# Patient Record
Sex: FEMALE | Race: OTHER | NOT HISPANIC OR LATINO | URBAN - METROPOLITAN AREA
[De-identification: names, ages, dates, MRNs, and addresses within clinical notes are randomized per-mention and may not be internally consistent; named-entity substitution may affect disease eponyms.]

---

## 2017-05-03 ENCOUNTER — OUTPATIENT (OUTPATIENT)
Dept: OUTPATIENT SERVICES | Facility: HOSPITAL | Age: 77
LOS: 1 days | End: 2017-05-03
Payer: COMMERCIAL

## 2017-05-03 VITALS
TEMPERATURE: 98 F | RESPIRATION RATE: 14 BRPM | SYSTOLIC BLOOD PRESSURE: 158 MMHG | WEIGHT: 164.02 LBS | DIASTOLIC BLOOD PRESSURE: 82 MMHG | HEART RATE: 60 BPM | OXYGEN SATURATION: 99 % | HEIGHT: 62 IN

## 2017-05-03 DIAGNOSIS — K43.0 INCISIONAL HERNIA WITH OBSTRUCTION, WITHOUT GANGRENE: Chronic | ICD-10-CM

## 2017-05-03 DIAGNOSIS — Z90.49 ACQUIRED ABSENCE OF OTHER SPECIFIED PARTS OF DIGESTIVE TRACT: Chronic | ICD-10-CM

## 2017-05-03 DIAGNOSIS — Z90.11 ACQUIRED ABSENCE OF RIGHT BREAST AND NIPPLE: Chronic | ICD-10-CM

## 2017-05-03 DIAGNOSIS — S86.002S UNSPECIFIED INJURY OF LEFT ACHILLES TENDON, SEQUELA: Chronic | ICD-10-CM

## 2017-05-03 DIAGNOSIS — H33.21 SEROUS RETINAL DETACHMENT, RIGHT EYE: Chronic | ICD-10-CM

## 2017-05-03 DIAGNOSIS — H26.9 UNSPECIFIED CATARACT: Chronic | ICD-10-CM

## 2017-05-03 DIAGNOSIS — M17.12 UNILATERAL PRIMARY OSTEOARTHRITIS, LEFT KNEE: ICD-10-CM

## 2017-05-03 DIAGNOSIS — I10 ESSENTIAL (PRIMARY) HYPERTENSION: ICD-10-CM

## 2017-05-03 DIAGNOSIS — Z90.710 ACQUIRED ABSENCE OF BOTH CERVIX AND UTERUS: Chronic | ICD-10-CM

## 2017-05-03 DIAGNOSIS — Z01.818 ENCOUNTER FOR OTHER PREPROCEDURAL EXAMINATION: ICD-10-CM

## 2017-05-03 LAB
MRSA PCR RESULT.: SIGNIFICANT CHANGE UP
S AUREUS DNA NOSE QL NAA+PROBE: SIGNIFICANT CHANGE UP

## 2017-05-03 PROCEDURE — 87640 STAPH A DNA AMP PROBE: CPT

## 2017-05-03 PROCEDURE — 86900 BLOOD TYPING SEROLOGIC ABO: CPT

## 2017-05-03 PROCEDURE — G0463: CPT

## 2017-05-03 PROCEDURE — 87641 MR-STAPH DNA AMP PROBE: CPT

## 2017-05-03 PROCEDURE — 86850 RBC ANTIBODY SCREEN: CPT

## 2017-05-03 PROCEDURE — 86901 BLOOD TYPING SEROLOGIC RH(D): CPT

## 2017-05-03 NOTE — H&P PST ADULT - RS GEN PE MLT RESP DETAILS PC
breath sounds equal/clear to auscultation bilaterally/airway patent/normal/no chest wall tenderness/respirations non-labored/no rales/good air movement/no wheezes/no rhonchi/no intercostal retractions/no subcutaneous emphysema

## 2017-05-03 NOTE — H&P PST ADULT - NSANTHOSAYNRD_GEN_A_CORE
No. EVGENY screening performed.  STOP BANG Legend: 0-2 = LOW Risk; 3-4 = INTERMEDIATE Risk; 5-8 = HIGH Risk

## 2017-05-03 NOTE — H&P PST ADULT - FAMILY HISTORY
Mother  Still living? No  Family history of Alzheimer's disease, Age at diagnosis: Age Unknown     Father  Still living? No  Family history of cirrhosis of liver, Age at diagnosis: Age Unknown

## 2017-05-03 NOTE — H&P PST ADULT - PROBLEM SELECTOR PLAN 1
Scheduled for left total knee replacement on 5/9/2017. Preoperative instructions discussed and given to patient. Verbalized understanding of instructions

## 2017-05-03 NOTE — H&P PST ADULT - PMH
Diabetes mellitus, type 2    Essential (primary) hypertension    Hyperlipidemia, unspecified hyperlipidemia type    Obesity (BMI 30-39.9)    Primary osteoarthritis of left knee

## 2017-05-03 NOTE — H&P PST ADULT - NEGATIVE CARDIOVASCULAR SYMPTOMS
no palpitations/no claudication/no chest pain/no peripheral edema/no dyspnea on exertion/no orthopnea/no paroxysmal nocturnal dyspnea

## 2017-05-03 NOTE — H&P PST ADULT - GASTROINTESTINAL DETAILS
nontender/normal/soft/no masses palpable/no distention/bowel sounds normal no masses palpable/no distention/nontender/normal/obese abdomen/bowel sounds normal/soft

## 2017-05-03 NOTE — H&P PST ADULT - MUSCULOSKELETAL
negative No joint pain, swelling or deformity; no limitation of movement decreased ROM due to pain/diminished strength/joint swelling/joint warmth details… detailed exam

## 2017-05-03 NOTE — H&P PST ADULT - PSH
Cataracts, bilateral  excised in 2010  Detached retina, right  repaired in 2011  History of appendectomy  12 years old  History of lumpectomy of right breast  2001  Incisional hernia with obstruction but no gangrene  x3  Injury of Achilles tendon, left, sequela  s/p repair of left achilles tendon 6/17/2007  S/P laparoscopic cholecystectomy  2010  S/P [id number] (total abdominal hysterectomy and bilateral salpingo-oophorectomy)  1993 (due to fibroids)

## 2017-05-03 NOTE — H&P PST ADULT - NEGATIVE GENERAL SYMPTOMS
no weight gain/no fatigue/no weight loss/no polydipsia/no fever/no polyuria/no polyphagia/no malaise/no chills/no sweating/no anorexia

## 2017-05-03 NOTE — H&P PST ADULT - HISTORY OF PRESENT ILLNESS
78 y/o female with PMH of essential hypertension, hyperlipidemia, TIA (2004), and osteoarthritis in both knees is here today for presurgical evaluation prior to surgery. She complains of pain in both knees pain associated with difficulty sitting for long period. Failed conservative treatment with physical therapy and is now scheduled for left total knee replacement on 5/9/2017 78 y/o female with PMH of essential hypertension, hyperlipidemia, TIA (2004), and osteoarthritis in both knees is here today for presurgical evaluation prior to surgery. She complains of pain in both knees pain associated with difficulty sitting for long periods. Failed conservative treatment with physical therapy and is now scheduled for left total knee replacement on 5/9/2017

## 2017-05-03 NOTE — H&P PST ADULT - ASSESSMENT
76 y/o female with PMH of essential hypertension, hyperlipidemia, TIA (2004), and osteoarthritis in both knees is here today for presurgical evaluation prior to surgery. She complains of pain in both knees pain associated with difficulty sitting for long period. Failed conservative treatment with physical therapy and is now scheduled for left total knee replacement on 5/9/2017 76 y/o female with PMH of essential hypertension, hyperlipidemia, TIA (2004), and osteoarthritis in both knees scheduled for left total knee replacement on 5/9/2017. Patient is at low risk for planned procedure.

## 2017-05-08 RX ORDER — ACETAMINOPHEN 500 MG
975 TABLET ORAL ONCE
Qty: 0 | Refills: 0 | Status: COMPLETED | OUTPATIENT
Start: 2017-05-09 | End: 2017-05-09

## 2017-05-08 RX ORDER — CELECOXIB 200 MG/1
200 CAPSULE ORAL ONCE
Qty: 0 | Refills: 0 | Status: COMPLETED | OUTPATIENT
Start: 2017-05-09 | End: 2017-05-09

## 2017-05-09 ENCOUNTER — RESULT REVIEW (OUTPATIENT)
Age: 77
End: 2017-05-09

## 2017-05-09 ENCOUNTER — TRANSCRIPTION ENCOUNTER (OUTPATIENT)
Age: 77
End: 2017-05-09

## 2017-05-09 ENCOUNTER — INPATIENT (INPATIENT)
Facility: HOSPITAL | Age: 77
LOS: 2 days | Discharge: NURSING, REHAB OR LONG-TERM CARE | DRG: 470 | End: 2017-05-12
Attending: ORTHOPAEDIC SURGERY | Admitting: ORTHOPAEDIC SURGERY
Payer: COMMERCIAL

## 2017-05-09 VITALS
HEART RATE: 66 BPM | WEIGHT: 164.02 LBS | DIASTOLIC BLOOD PRESSURE: 76 MMHG | TEMPERATURE: 98 F | RESPIRATION RATE: 18 BRPM | HEIGHT: 62 IN | SYSTOLIC BLOOD PRESSURE: 146 MMHG | OXYGEN SATURATION: 97 %

## 2017-05-09 DIAGNOSIS — H33.21 SEROUS RETINAL DETACHMENT, RIGHT EYE: Chronic | ICD-10-CM

## 2017-05-09 DIAGNOSIS — S86.002S UNSPECIFIED INJURY OF LEFT ACHILLES TENDON, SEQUELA: Chronic | ICD-10-CM

## 2017-05-09 DIAGNOSIS — M17.12 UNILATERAL PRIMARY OSTEOARTHRITIS, LEFT KNEE: ICD-10-CM

## 2017-05-09 DIAGNOSIS — Z90.710 ACQUIRED ABSENCE OF BOTH CERVIX AND UTERUS: Chronic | ICD-10-CM

## 2017-05-09 DIAGNOSIS — H26.9 UNSPECIFIED CATARACT: Chronic | ICD-10-CM

## 2017-05-09 DIAGNOSIS — Z90.49 ACQUIRED ABSENCE OF OTHER SPECIFIED PARTS OF DIGESTIVE TRACT: Chronic | ICD-10-CM

## 2017-05-09 DIAGNOSIS — Z90.11 ACQUIRED ABSENCE OF RIGHT BREAST AND NIPPLE: Chronic | ICD-10-CM

## 2017-05-09 DIAGNOSIS — K43.0 INCISIONAL HERNIA WITH OBSTRUCTION, WITHOUT GANGRENE: Chronic | ICD-10-CM

## 2017-05-09 LAB
ANION GAP SERPL CALC-SCNC: 6 MMOL/L — SIGNIFICANT CHANGE UP (ref 5–17)
BUN SERPL-MCNC: 7 MG/DL — SIGNIFICANT CHANGE UP (ref 7–18)
CALCIUM SERPL-MCNC: 8.1 MG/DL — LOW (ref 8.4–10.5)
CHLORIDE SERPL-SCNC: 107 MMOL/L — SIGNIFICANT CHANGE UP (ref 96–108)
CO2 SERPL-SCNC: 29 MMOL/L — SIGNIFICANT CHANGE UP (ref 22–31)
CREAT SERPL-MCNC: 0.68 MG/DL — SIGNIFICANT CHANGE UP (ref 0.5–1.3)
GLUCOSE SERPL-MCNC: 116 MG/DL — HIGH (ref 70–99)
HCT VFR BLD CALC: 36.8 % — SIGNIFICANT CHANGE UP (ref 34.5–45)
HGB BLD-MCNC: 12.1 G/DL — SIGNIFICANT CHANGE UP (ref 11.5–15.5)
MCHC RBC-ENTMCNC: 28.6 PG — SIGNIFICANT CHANGE UP (ref 27–34)
MCHC RBC-ENTMCNC: 32.9 GM/DL — SIGNIFICANT CHANGE UP (ref 32–36)
MCV RBC AUTO: 87 FL — SIGNIFICANT CHANGE UP (ref 80–100)
PLATELET # BLD AUTO: 203 K/UL — SIGNIFICANT CHANGE UP (ref 150–400)
POTASSIUM SERPL-MCNC: 3.2 MMOL/L — LOW (ref 3.5–5.3)
POTASSIUM SERPL-SCNC: 3.2 MMOL/L — LOW (ref 3.5–5.3)
RBC # BLD: 4.23 M/UL — SIGNIFICANT CHANGE UP (ref 3.8–5.2)
RBC # FLD: 11.8 % — SIGNIFICANT CHANGE UP (ref 10.3–14.5)
SODIUM SERPL-SCNC: 142 MMOL/L — SIGNIFICANT CHANGE UP (ref 135–145)
WBC # BLD: 4.3 K/UL — SIGNIFICANT CHANGE UP (ref 3.8–10.5)
WBC # FLD AUTO: 4.3 K/UL — SIGNIFICANT CHANGE UP (ref 3.8–10.5)

## 2017-05-09 PROCEDURE — 88305 TISSUE EXAM BY PATHOLOGIST: CPT | Mod: 26

## 2017-05-09 PROCEDURE — 73560 X-RAY EXAM OF KNEE 1 OR 2: CPT | Mod: 26

## 2017-05-09 PROCEDURE — 73562 X-RAY EXAM OF KNEE 3: CPT | Mod: 26,LT

## 2017-05-09 PROCEDURE — 27350 REMOVAL OF KNEECAP: CPT | Mod: AS,59,LT

## 2017-05-09 PROCEDURE — 88311 DECALCIFY TISSUE: CPT | Mod: 26

## 2017-05-09 PROCEDURE — 27637 REMOVE/GRAFT LEG BONE LESION: CPT | Mod: AS,59,LT

## 2017-05-09 PROCEDURE — 27447 TOTAL KNEE ARTHROPLASTY: CPT | Mod: AS,LT

## 2017-05-09 RX ORDER — INSULIN LISPRO 100/ML
VIAL (ML) SUBCUTANEOUS
Qty: 0 | Refills: 0 | Status: DISCONTINUED | OUTPATIENT
Start: 2017-05-09 | End: 2017-05-12

## 2017-05-09 RX ORDER — BUPIVACAINE 13.3 MG/ML
20 INJECTION, SUSPENSION, LIPOSOMAL INFILTRATION ONCE
Qty: 0 | Refills: 0 | Status: DISCONTINUED | OUTPATIENT
Start: 2017-05-09 | End: 2017-05-09

## 2017-05-09 RX ORDER — DOCUSATE SODIUM 100 MG
100 CAPSULE ORAL THREE TIMES A DAY
Qty: 0 | Refills: 0 | Status: DISCONTINUED | OUTPATIENT
Start: 2017-05-09 | End: 2017-05-12

## 2017-05-09 RX ORDER — LOSARTAN POTASSIUM 100 MG/1
25 TABLET, FILM COATED ORAL DAILY
Qty: 0 | Refills: 0 | Status: DISCONTINUED | OUTPATIENT
Start: 2017-05-09 | End: 2017-05-12

## 2017-05-09 RX ORDER — ENOXAPARIN SODIUM 100 MG/ML
30 INJECTION SUBCUTANEOUS EVERY 12 HOURS
Qty: 0 | Refills: 0 | Status: DISCONTINUED | OUTPATIENT
Start: 2017-05-09 | End: 2017-05-12

## 2017-05-09 RX ORDER — ONDANSETRON 8 MG/1
4 TABLET, FILM COATED ORAL EVERY 6 HOURS
Qty: 0 | Refills: 0 | Status: DISCONTINUED | OUTPATIENT
Start: 2017-05-09 | End: 2017-05-11

## 2017-05-09 RX ORDER — NALOXONE HYDROCHLORIDE 4 MG/.1ML
0.1 SPRAY NASAL
Qty: 0 | Refills: 0 | Status: DISCONTINUED | OUTPATIENT
Start: 2017-05-09 | End: 2017-05-11

## 2017-05-09 RX ORDER — ONDANSETRON 8 MG/1
4 TABLET, FILM COATED ORAL ONCE
Qty: 0 | Refills: 0 | Status: DISCONTINUED | OUTPATIENT
Start: 2017-05-09 | End: 2017-05-09

## 2017-05-09 RX ORDER — CLOPIDOGREL BISULFATE 75 MG/1
75 TABLET, FILM COATED ORAL DAILY
Qty: 0 | Refills: 0 | Status: DISCONTINUED | OUTPATIENT
Start: 2017-05-09 | End: 2017-05-12

## 2017-05-09 RX ORDER — SENNA PLUS 8.6 MG/1
2 TABLET ORAL AT BEDTIME
Qty: 0 | Refills: 0 | Status: DISCONTINUED | OUTPATIENT
Start: 2017-05-09 | End: 2017-05-12

## 2017-05-09 RX ORDER — SODIUM CHLORIDE 9 MG/ML
1000 INJECTION, SOLUTION INTRAVENOUS
Qty: 0 | Refills: 0 | Status: DISCONTINUED | OUTPATIENT
Start: 2017-05-09 | End: 2017-05-12

## 2017-05-09 RX ORDER — AMLODIPINE BESYLATE 2.5 MG/1
10 TABLET ORAL DAILY
Qty: 0 | Refills: 0 | Status: DISCONTINUED | OUTPATIENT
Start: 2017-05-09 | End: 2017-05-12

## 2017-05-09 RX ORDER — HYDROMORPHONE HYDROCHLORIDE 2 MG/ML
0.5 INJECTION INTRAMUSCULAR; INTRAVENOUS; SUBCUTANEOUS
Qty: 0 | Refills: 0 | Status: DISCONTINUED | OUTPATIENT
Start: 2017-05-09 | End: 2017-05-09

## 2017-05-09 RX ORDER — ASCORBIC ACID 60 MG
500 TABLET,CHEWABLE ORAL
Qty: 0 | Refills: 0 | Status: DISCONTINUED | OUTPATIENT
Start: 2017-05-09 | End: 2017-05-12

## 2017-05-09 RX ORDER — FOLIC ACID 0.8 MG
1 TABLET ORAL DAILY
Qty: 0 | Refills: 0 | Status: DISCONTINUED | OUTPATIENT
Start: 2017-05-09 | End: 2017-05-12

## 2017-05-09 RX ORDER — HYDROMORPHONE HYDROCHLORIDE 2 MG/ML
30 INJECTION INTRAMUSCULAR; INTRAVENOUS; SUBCUTANEOUS
Qty: 0 | Refills: 0 | Status: DISCONTINUED | OUTPATIENT
Start: 2017-05-09 | End: 2017-05-11

## 2017-05-09 RX ORDER — ATORVASTATIN CALCIUM 80 MG/1
20 TABLET, FILM COATED ORAL AT BEDTIME
Qty: 0 | Refills: 0 | Status: DISCONTINUED | OUTPATIENT
Start: 2017-05-09 | End: 2017-05-12

## 2017-05-09 RX ORDER — ONDANSETRON 8 MG/1
4 TABLET, FILM COATED ORAL EVERY 6 HOURS
Qty: 0 | Refills: 0 | Status: DISCONTINUED | OUTPATIENT
Start: 2017-05-09 | End: 2017-05-12

## 2017-05-09 RX ORDER — MAGNESIUM HYDROXIDE 400 MG/1
30 TABLET, CHEWABLE ORAL DAILY
Qty: 0 | Refills: 0 | Status: DISCONTINUED | OUTPATIENT
Start: 2017-05-09 | End: 2017-05-12

## 2017-05-09 RX ORDER — FERROUS SULFATE 325(65) MG
325 TABLET ORAL
Qty: 0 | Refills: 0 | Status: DISCONTINUED | OUTPATIENT
Start: 2017-05-09 | End: 2017-05-12

## 2017-05-09 RX ORDER — ACETAMINOPHEN 500 MG
650 TABLET ORAL EVERY 6 HOURS
Qty: 0 | Refills: 0 | Status: DISCONTINUED | OUTPATIENT
Start: 2017-05-09 | End: 2017-05-12

## 2017-05-09 RX ORDER — POTASSIUM CHLORIDE 20 MEQ
10 PACKET (EA) ORAL
Qty: 0 | Refills: 0 | Status: COMPLETED | OUTPATIENT
Start: 2017-05-09 | End: 2017-05-09

## 2017-05-09 RX ORDER — SODIUM CHLORIDE 9 MG/ML
3 INJECTION INTRAMUSCULAR; INTRAVENOUS; SUBCUTANEOUS EVERY 8 HOURS
Qty: 0 | Refills: 0 | Status: DISCONTINUED | OUTPATIENT
Start: 2017-05-09 | End: 2017-05-09

## 2017-05-09 RX ORDER — GLUCAGON INJECTION, SOLUTION 0.5 MG/.1ML
1 INJECTION, SOLUTION SUBCUTANEOUS ONCE
Qty: 0 | Refills: 0 | Status: DISCONTINUED | OUTPATIENT
Start: 2017-05-09 | End: 2017-05-12

## 2017-05-09 RX ORDER — CEFAZOLIN SODIUM 1 G
1000 VIAL (EA) INJECTION EVERY 8 HOURS
Qty: 0 | Refills: 0 | Status: COMPLETED | OUTPATIENT
Start: 2017-05-09 | End: 2017-05-10

## 2017-05-09 RX ADMIN — ATORVASTATIN CALCIUM 20 MILLIGRAM(S): 80 TABLET, FILM COATED ORAL at 22:22

## 2017-05-09 RX ADMIN — CELECOXIB 200 MILLIGRAM(S): 200 CAPSULE ORAL at 11:42

## 2017-05-09 RX ADMIN — SODIUM CHLORIDE 3 MILLILITER(S): 9 INJECTION INTRAMUSCULAR; INTRAVENOUS; SUBCUTANEOUS at 11:43

## 2017-05-09 RX ADMIN — HYDROMORPHONE HYDROCHLORIDE 30 MILLILITER(S): 2 INJECTION INTRAMUSCULAR; INTRAVENOUS; SUBCUTANEOUS at 19:22

## 2017-05-09 RX ADMIN — Medication 100 MILLIGRAM(S): at 22:21

## 2017-05-09 RX ADMIN — Medication 100 MILLIEQUIVALENT(S): at 22:18

## 2017-05-09 RX ADMIN — HYDROMORPHONE HYDROCHLORIDE 30 MILLILITER(S): 2 INJECTION INTRAMUSCULAR; INTRAVENOUS; SUBCUTANEOUS at 16:53

## 2017-05-09 RX ADMIN — HYDROMORPHONE HYDROCHLORIDE 30 MILLILITER(S): 2 INJECTION INTRAMUSCULAR; INTRAVENOUS; SUBCUTANEOUS at 18:30

## 2017-05-09 RX ADMIN — Medication 975 MILLIGRAM(S): at 11:42

## 2017-05-09 RX ADMIN — Medication 100 MILLIEQUIVALENT(S): at 20:12

## 2017-05-09 NOTE — PATIENT PROFILE ADULT. - VISION (WITH CORRECTIVE LENSES IF THE PATIENT USUALLY WEARS THEM):
Partially impaired: cannot see medication labels or newsprint, but can see obstacles in path, and the surrounding layout; can count fingers at arm's length/glasses for everythging

## 2017-05-10 DIAGNOSIS — E78.5 HYPERLIPIDEMIA, UNSPECIFIED: ICD-10-CM

## 2017-05-10 DIAGNOSIS — E11.9 TYPE 2 DIABETES MELLITUS WITHOUT COMPLICATIONS: ICD-10-CM

## 2017-05-10 DIAGNOSIS — G89.18 OTHER ACUTE POSTPROCEDURAL PAIN: ICD-10-CM

## 2017-05-10 DIAGNOSIS — Z96.652 PRESENCE OF LEFT ARTIFICIAL KNEE JOINT: ICD-10-CM

## 2017-05-10 LAB
ANION GAP SERPL CALC-SCNC: 7 MMOL/L — SIGNIFICANT CHANGE UP (ref 5–17)
BUN SERPL-MCNC: 5 MG/DL — LOW (ref 7–18)
CALCIUM SERPL-MCNC: 8.5 MG/DL — SIGNIFICANT CHANGE UP (ref 8.4–10.5)
CHLORIDE SERPL-SCNC: 102 MMOL/L — SIGNIFICANT CHANGE UP (ref 96–108)
CO2 SERPL-SCNC: 31 MMOL/L — SIGNIFICANT CHANGE UP (ref 22–31)
CREAT SERPL-MCNC: 0.62 MG/DL — SIGNIFICANT CHANGE UP (ref 0.5–1.3)
GLUCOSE SERPL-MCNC: 111 MG/DL — HIGH (ref 70–99)
HBA1C BLD-MCNC: 5.8 % — HIGH (ref 4–5.6)
HCT VFR BLD CALC: 34.8 % — SIGNIFICANT CHANGE UP (ref 34.5–45)
HGB BLD-MCNC: 11.7 G/DL — SIGNIFICANT CHANGE UP (ref 11.5–15.5)
MCHC RBC-ENTMCNC: 29.2 PG — SIGNIFICANT CHANGE UP (ref 27–34)
MCHC RBC-ENTMCNC: 33.7 GM/DL — SIGNIFICANT CHANGE UP (ref 32–36)
MCV RBC AUTO: 86.8 FL — SIGNIFICANT CHANGE UP (ref 80–100)
PLATELET # BLD AUTO: 195 K/UL — SIGNIFICANT CHANGE UP (ref 150–400)
POTASSIUM SERPL-MCNC: 4.5 MMOL/L — SIGNIFICANT CHANGE UP (ref 3.5–5.3)
POTASSIUM SERPL-SCNC: 4.5 MMOL/L — SIGNIFICANT CHANGE UP (ref 3.5–5.3)
RBC # BLD: 4.01 M/UL — SIGNIFICANT CHANGE UP (ref 3.8–5.2)
RBC # FLD: 11.4 % — SIGNIFICANT CHANGE UP (ref 10.3–14.5)
SODIUM SERPL-SCNC: 140 MMOL/L — SIGNIFICANT CHANGE UP (ref 135–145)
WBC # BLD: 7 K/UL — SIGNIFICANT CHANGE UP (ref 3.8–10.5)
WBC # FLD AUTO: 7 K/UL — SIGNIFICANT CHANGE UP (ref 3.8–10.5)

## 2017-05-10 PROCEDURE — 99232 SBSQ HOSP IP/OBS MODERATE 35: CPT

## 2017-05-10 RX ORDER — KETOROLAC TROMETHAMINE 30 MG/ML
15 SYRINGE (ML) INJECTION EVERY 6 HOURS
Qty: 0 | Refills: 0 | Status: DISCONTINUED | OUTPATIENT
Start: 2017-05-10 | End: 2017-05-12

## 2017-05-10 RX ADMIN — Medication 100 MILLIEQUIVALENT(S): at 00:16

## 2017-05-10 RX ADMIN — ATORVASTATIN CALCIUM 20 MILLIGRAM(S): 80 TABLET, FILM COATED ORAL at 21:36

## 2017-05-10 RX ADMIN — HYDROMORPHONE HYDROCHLORIDE 30 MILLILITER(S): 2 INJECTION INTRAMUSCULAR; INTRAVENOUS; SUBCUTANEOUS at 19:24

## 2017-05-10 RX ADMIN — Medication 100 MILLIGRAM(S): at 06:05

## 2017-05-10 RX ADMIN — Medication 1 MILLIGRAM(S): at 11:37

## 2017-05-10 RX ADMIN — Medication 500 MILLIGRAM(S): at 06:10

## 2017-05-10 RX ADMIN — HYDROMORPHONE HYDROCHLORIDE 30 MILLILITER(S): 2 INJECTION INTRAMUSCULAR; INTRAVENOUS; SUBCUTANEOUS at 07:11

## 2017-05-10 RX ADMIN — Medication 325 MILLIGRAM(S): at 17:53

## 2017-05-10 RX ADMIN — Medication 500 MILLIGRAM(S): at 17:55

## 2017-05-10 RX ADMIN — Medication 2: at 17:57

## 2017-05-10 RX ADMIN — CLOPIDOGREL BISULFATE 75 MILLIGRAM(S): 75 TABLET, FILM COATED ORAL at 11:37

## 2017-05-10 RX ADMIN — LOSARTAN POTASSIUM 25 MILLIGRAM(S): 100 TABLET, FILM COATED ORAL at 06:10

## 2017-05-10 RX ADMIN — Medication 4: at 11:35

## 2017-05-10 RX ADMIN — Medication 325 MILLIGRAM(S): at 09:14

## 2017-05-10 RX ADMIN — ENOXAPARIN SODIUM 30 MILLIGRAM(S): 100 INJECTION SUBCUTANEOUS at 17:52

## 2017-05-10 RX ADMIN — Medication 325 MILLIGRAM(S): at 11:37

## 2017-05-10 RX ADMIN — ENOXAPARIN SODIUM 30 MILLIGRAM(S): 100 INJECTION SUBCUTANEOUS at 06:12

## 2017-05-10 RX ADMIN — Medication 1 TABLET(S): at 11:37

## 2017-05-10 RX ADMIN — AMLODIPINE BESYLATE 10 MILLIGRAM(S): 2.5 TABLET ORAL at 06:09

## 2017-05-10 NOTE — PHYSICAL THERAPY INITIAL EVALUATION ADULT - CRITERIA FOR SKILLED THERAPEUTIC INTERVENTIONS
therapy frequency/anticipated equipment needs at discharge/anticipated discharge recommendation/impairments found/rehab potential/predicted duration of therapy intervention

## 2017-05-11 ENCOUNTER — TRANSCRIPTION ENCOUNTER (OUTPATIENT)
Age: 77
End: 2017-05-11

## 2017-05-11 LAB
ANION GAP SERPL CALC-SCNC: 7 MMOL/L — SIGNIFICANT CHANGE UP (ref 5–17)
BUN SERPL-MCNC: 11 MG/DL — SIGNIFICANT CHANGE UP (ref 7–18)
CALCIUM SERPL-MCNC: 8.6 MG/DL — SIGNIFICANT CHANGE UP (ref 8.4–10.5)
CHLORIDE SERPL-SCNC: 101 MMOL/L — SIGNIFICANT CHANGE UP (ref 96–108)
CO2 SERPL-SCNC: 30 MMOL/L — SIGNIFICANT CHANGE UP (ref 22–31)
CREAT SERPL-MCNC: 0.64 MG/DL — SIGNIFICANT CHANGE UP (ref 0.5–1.3)
GLUCOSE SERPL-MCNC: 117 MG/DL — HIGH (ref 70–99)
HCT VFR BLD CALC: 33.3 % — LOW (ref 34.5–45)
HGB BLD-MCNC: 11.2 G/DL — LOW (ref 11.5–15.5)
MCHC RBC-ENTMCNC: 29 PG — SIGNIFICANT CHANGE UP (ref 27–34)
MCHC RBC-ENTMCNC: 33.7 GM/DL — SIGNIFICANT CHANGE UP (ref 32–36)
MCV RBC AUTO: 86.1 FL — SIGNIFICANT CHANGE UP (ref 80–100)
PLATELET # BLD AUTO: 189 K/UL — SIGNIFICANT CHANGE UP (ref 150–400)
POTASSIUM SERPL-MCNC: 3.5 MMOL/L — SIGNIFICANT CHANGE UP (ref 3.5–5.3)
POTASSIUM SERPL-SCNC: 3.5 MMOL/L — SIGNIFICANT CHANGE UP (ref 3.5–5.3)
RBC # BLD: 3.87 M/UL — SIGNIFICANT CHANGE UP (ref 3.8–5.2)
RBC # FLD: 11.2 % — SIGNIFICANT CHANGE UP (ref 10.3–14.5)
SODIUM SERPL-SCNC: 138 MMOL/L — SIGNIFICANT CHANGE UP (ref 135–145)
SURGICAL PATHOLOGY FINAL REPORT - CH: SIGNIFICANT CHANGE UP
WBC # BLD: 7.4 K/UL — SIGNIFICANT CHANGE UP (ref 3.8–10.5)
WBC # FLD AUTO: 7.4 K/UL — SIGNIFICANT CHANGE UP (ref 3.8–10.5)

## 2017-05-11 PROCEDURE — 99232 SBSQ HOSP IP/OBS MODERATE 35: CPT

## 2017-05-11 RX ORDER — ROSUVASTATIN CALCIUM 5 MG/1
1 TABLET ORAL
Qty: 0 | Refills: 0 | COMMUNITY

## 2017-05-11 RX ORDER — ASCORBIC ACID 60 MG
1 TABLET,CHEWABLE ORAL
Qty: 0 | Refills: 0 | DISCHARGE
Start: 2017-05-11

## 2017-05-11 RX ORDER — FOLIC ACID 0.8 MG
1 TABLET ORAL
Qty: 0 | Refills: 0 | DISCHARGE
Start: 2017-05-11

## 2017-05-11 RX ORDER — OXYCODONE HYDROCHLORIDE 5 MG/1
10 TABLET ORAL EVERY 4 HOURS
Qty: 0 | Refills: 0 | Status: DISCONTINUED | OUTPATIENT
Start: 2017-05-11 | End: 2017-05-12

## 2017-05-11 RX ORDER — ATORVASTATIN CALCIUM 80 MG/1
1 TABLET, FILM COATED ORAL
Qty: 0 | Refills: 0 | DISCHARGE
Start: 2017-05-11

## 2017-05-11 RX ADMIN — Medication 15 MILLIGRAM(S): at 00:35

## 2017-05-11 RX ADMIN — ENOXAPARIN SODIUM 30 MILLIGRAM(S): 100 INJECTION SUBCUTANEOUS at 05:24

## 2017-05-11 RX ADMIN — Medication 500 MILLIGRAM(S): at 05:21

## 2017-05-11 RX ADMIN — Medication 2: at 16:06

## 2017-05-11 RX ADMIN — Medication 325 MILLIGRAM(S): at 07:52

## 2017-05-11 RX ADMIN — Medication 15 MILLIGRAM(S): at 00:02

## 2017-05-11 RX ADMIN — Medication 100 MILLIGRAM(S): at 21:39

## 2017-05-11 RX ADMIN — Medication 500 MILLIGRAM(S): at 17:15

## 2017-05-11 RX ADMIN — AMLODIPINE BESYLATE 10 MILLIGRAM(S): 2.5 TABLET ORAL at 05:21

## 2017-05-11 RX ADMIN — SODIUM CHLORIDE 80 MILLILITER(S): 9 INJECTION, SOLUTION INTRAVENOUS at 05:21

## 2017-05-11 RX ADMIN — Medication 1 MILLIGRAM(S): at 11:25

## 2017-05-11 RX ADMIN — Medication 325 MILLIGRAM(S): at 11:26

## 2017-05-11 RX ADMIN — Medication 1 TABLET(S): at 11:26

## 2017-05-11 RX ADMIN — Medication 15 MILLIGRAM(S): at 21:39

## 2017-05-11 RX ADMIN — CLOPIDOGREL BISULFATE 75 MILLIGRAM(S): 75 TABLET, FILM COATED ORAL at 11:26

## 2017-05-11 RX ADMIN — Medication: at 12:19

## 2017-05-11 RX ADMIN — ATORVASTATIN CALCIUM 20 MILLIGRAM(S): 80 TABLET, FILM COATED ORAL at 21:39

## 2017-05-11 RX ADMIN — Medication 15 MILLIGRAM(S): at 22:00

## 2017-05-11 RX ADMIN — LOSARTAN POTASSIUM 25 MILLIGRAM(S): 100 TABLET, FILM COATED ORAL at 05:21

## 2017-05-11 RX ADMIN — ENOXAPARIN SODIUM 30 MILLIGRAM(S): 100 INJECTION SUBCUTANEOUS at 17:15

## 2017-05-11 RX ADMIN — HYDROMORPHONE HYDROCHLORIDE 30 MILLILITER(S): 2 INJECTION INTRAMUSCULAR; INTRAVENOUS; SUBCUTANEOUS at 07:12

## 2017-05-11 RX ADMIN — Medication 325 MILLIGRAM(S): at 17:15

## 2017-05-11 NOTE — DISCHARGE NOTE ADULT - CARE PROVIDER_API CALL
Curtis Del Cid), Orthopaedic Surgery; Sports Medicine  [id number] [address] [place] [address]  Phone: [phone]  Fax: [phone]

## 2017-05-11 NOTE — DISCHARGE NOTE ADULT - SECONDARY DIAGNOSIS.
Detached retina, right Diabetes mellitus, type 2 Hyperlipidemia, unspecified hyperlipidemia type History of lumpectomy of right breast Obesity (BMI 30-39.9) Post-op pain

## 2017-05-11 NOTE — DISCHARGE NOTE ADULT - PLAN OF CARE
IMPROVE ROM Keep wound/bandage clean, dry and intact. Return to ER if Fever of 101 F or greater develops

## 2017-05-11 NOTE — DISCHARGE NOTE ADULT - HOSPITAL COURSE
Pain Management- See Attached Medication Reconciliation    **StretchStrap Stretching exercises for Total knee replacement***  Weight Bearing Status: _WBAT of LLE  Equipment needs: Commode, Walker  Incision Site: REMOVE STAPLES on _5/25/17  STAPLES TO BE REMOVED BY NURSE  NURSE TO APPLY STERI-STRIPS TO THE WOUND AFTER STAPLE REMOVAL   Dvt prophylaxis: D/C LOVENOX on _5/25/17  PT/Occupational Therapy are Activities of Daily Living as appropriate  Follow up with Orthopedic Surgeon after STAPLES ARE REMOVED at:_ DR SOLIS [phone]

## 2017-05-11 NOTE — PROGRESS NOTE ADULT - PROBLEM SELECTOR PROBLEM 3
Hyperlipidemia, unspecified hyperlipidemia type
Post-op pain
Post-op pain
Hyperlipidemia, unspecified hyperlipidemia type

## 2017-05-11 NOTE — PROGRESS NOTE ADULT - PROBLEM SELECTOR PLAN 1
-S/p L TKA
continue PCA HYdromorphone  PCA teaching done  OOB with PT  Ice to knee  IV acetaminophen prn
- d/c PCA  - oxycodone 10 po prn  - toradol 15mg ivp q 6 hrs prn  - stool softeners  - OOB
-S/p L TKA

## 2017-05-11 NOTE — DISCHARGE NOTE ADULT - PATIENT PORTAL LINK FT
“You can access the Follow[facility] Patient Portal, offered by [facility], by registering with the following website: [email or web address]”

## 2017-05-11 NOTE — DISCHARGE NOTE ADULT - CARE PLAN
Principal Discharge DX:	Primary osteoarthritis of left knee  Goal:	IMPROVE ROM  Instructions for follow-up, activity and diet:	Keep wound/bandage clean, dry and intact. Return to ER if Fever of 101 F or greater develops  Secondary Diagnosis:	Detached retina, right  Secondary Diagnosis:	Diabetes mellitus, type 2  Secondary Diagnosis:	Hyperlipidemia, unspecified hyperlipidemia type  Secondary Diagnosis:	History of lumpectomy of right breast  Secondary Diagnosis:	Obesity (BMI 30-39.9)  Secondary Diagnosis:	Post-op pain

## 2017-05-11 NOTE — CHART NOTE - NSCHARTNOTEFT_GEN_A_CORE
Orthopedic addendum:                          11.2   7.4   )-----------( 189      ( 11 May 2017 07:56 )             33.3       05-11    138  |  101  |  11  ----------------------------<  117<H>  3.5   |  30  |  0.64    Ca    8.6      11 May 2017 07:56

## 2017-05-11 NOTE — DISCHARGE NOTE ADULT - MEDICATION SUMMARY - MEDICATIONS TO TAKE
I will START or STAY ON the medications listed below when I get home from the hospital:    Percocet 5/325 oral tablet  -- 1-2  tab(s) by mouth every 6 hours as needed for pain  -- Indication: For PAIN    losartan 25 mg oral tablet  -- 1 tab(s) by mouth once a day  -- Indication: For AS PER MD    Lovenox 30 mg/0.3 mL injectable solution  -- 30 milligram(s) subcutaneous every 12 hours  -- D/C ON 5/25/2017  -- Indication: For DVT PPX    metFORMIN 500 mg oral tablet, extended release  -- 1 tab(s) by mouth once a day  -- Indication: For AS PER MD    atorvastatin 20 mg oral tablet  -- 1 tab(s) by mouth once a day (at bedtime)  -- Indication: For AS PER MD    clopidogrel 75 mg oral tablet  -- 1 tab(s) by mouth once a day  -- Indication: For AS PER MD    amLODIPine 10 mg oral tablet  -- 1 tab(s) by mouth once a day  -- Indication: For AS PER MD    ferrous sulfate 325 mg (65 mg elemental iron) oral tablet  -- 1 tab(s) by mouth 2 times a day  -- Indication: For AS PER MD    Colace 100 mg oral capsule  -- 1 cap(s) by mouth 2 times a day, As Needed FOR CONSTIPATION  -- Indication: For AS PER MD    senna 8.6 mg oral tablet  -- 1 tab(s) by mouth once a day (at bedtime)  -- Indication: For AS PER MD    CoQ10 300 mg oral capsule  -- 1 cap(s) by mouth once a day  -- Indication: For AS PER MD    Multiple Vitamins oral tablet  -- 1 tab(s) by mouth once a day  -- Indication: For AS PER MD    ascorbic acid 500 mg oral tablet  -- 1 tab(s) by mouth 2 times a day  -- Indication: For AS PER MD    folic acid 1 mg oral tablet  -- 1 tab(s) by mouth once a day  -- Indication: For AS PER MD    Vitamin D3 1000 intl units oral capsule  -- 1 cap(s) by mouth once a day  -- Indication: For AS PER MD

## 2017-05-11 NOTE — PROGRESS NOTE ADULT - PROBLEM SELECTOR PROBLEM 2
Essential (primary) hypertension
H/O total knee replacement, left
Primary osteoarthritis of left knee
Essential (primary) hypertension

## 2017-05-12 VITALS
DIASTOLIC BLOOD PRESSURE: 56 MMHG | RESPIRATION RATE: 17 BRPM | TEMPERATURE: 99 F | HEART RATE: 86 BPM | SYSTOLIC BLOOD PRESSURE: 113 MMHG | OXYGEN SATURATION: 100 %

## 2017-05-12 LAB
ANION GAP SERPL CALC-SCNC: 6 MMOL/L — SIGNIFICANT CHANGE UP (ref 5–17)
BUN SERPL-MCNC: 13 MG/DL — SIGNIFICANT CHANGE UP (ref 7–18)
CALCIUM SERPL-MCNC: 8.4 MG/DL — SIGNIFICANT CHANGE UP (ref 8.4–10.5)
CHLORIDE SERPL-SCNC: 103 MMOL/L — SIGNIFICANT CHANGE UP (ref 96–108)
CO2 SERPL-SCNC: 30 MMOL/L — SIGNIFICANT CHANGE UP (ref 22–31)
CREAT SERPL-MCNC: 0.62 MG/DL — SIGNIFICANT CHANGE UP (ref 0.5–1.3)
GLUCOSE SERPL-MCNC: 114 MG/DL — HIGH (ref 70–99)
HCT VFR BLD CALC: 30.5 % — LOW (ref 34.5–45)
HGB BLD-MCNC: 10.3 G/DL — LOW (ref 11.5–15.5)
MCHC RBC-ENTMCNC: 29.1 PG — SIGNIFICANT CHANGE UP (ref 27–34)
MCHC RBC-ENTMCNC: 33.8 GM/DL — SIGNIFICANT CHANGE UP (ref 32–36)
MCV RBC AUTO: 86.3 FL — SIGNIFICANT CHANGE UP (ref 80–100)
PLATELET # BLD AUTO: 184 K/UL — SIGNIFICANT CHANGE UP (ref 150–400)
POTASSIUM SERPL-MCNC: 3.3 MMOL/L — LOW (ref 3.5–5.3)
POTASSIUM SERPL-SCNC: 3.3 MMOL/L — LOW (ref 3.5–5.3)
RBC # BLD: 3.54 M/UL — LOW (ref 3.8–5.2)
RBC # FLD: 11.3 % — SIGNIFICANT CHANGE UP (ref 10.3–14.5)
SODIUM SERPL-SCNC: 139 MMOL/L — SIGNIFICANT CHANGE UP (ref 135–145)
WBC # BLD: 7.6 K/UL — SIGNIFICANT CHANGE UP (ref 3.8–10.5)
WBC # FLD AUTO: 7.6 K/UL — SIGNIFICANT CHANGE UP (ref 3.8–10.5)

## 2017-05-12 PROCEDURE — 97116 GAIT TRAINING THERAPY: CPT

## 2017-05-12 PROCEDURE — 97163 PT EVAL HIGH COMPLEX 45 MIN: CPT

## 2017-05-12 PROCEDURE — 88311 DECALCIFY TISSUE: CPT

## 2017-05-12 PROCEDURE — 80048 BASIC METABOLIC PNL TOTAL CA: CPT

## 2017-05-12 PROCEDURE — 73560 X-RAY EXAM OF KNEE 1 OR 2: CPT

## 2017-05-12 PROCEDURE — 97110 THERAPEUTIC EXERCISES: CPT

## 2017-05-12 PROCEDURE — 73562 X-RAY EXAM OF KNEE 3: CPT

## 2017-05-12 PROCEDURE — C1776: CPT

## 2017-05-12 PROCEDURE — C1713: CPT

## 2017-05-12 PROCEDURE — 88305 TISSUE EXAM BY PATHOLOGIST: CPT

## 2017-05-12 PROCEDURE — 83036 HEMOGLOBIN GLYCOSYLATED A1C: CPT

## 2017-05-12 PROCEDURE — 97530 THERAPEUTIC ACTIVITIES: CPT

## 2017-05-12 PROCEDURE — 86850 RBC ANTIBODY SCREEN: CPT

## 2017-05-12 PROCEDURE — 86901 BLOOD TYPING SEROLOGIC RH(D): CPT

## 2017-05-12 PROCEDURE — 85027 COMPLETE CBC AUTOMATED: CPT

## 2017-05-12 RX ORDER — POTASSIUM CHLORIDE 20 MEQ
40 PACKET (EA) ORAL ONCE
Qty: 0 | Refills: 0 | Status: COMPLETED | OUTPATIENT
Start: 2017-05-12 | End: 2017-05-12

## 2017-05-12 RX ADMIN — LOSARTAN POTASSIUM 25 MILLIGRAM(S): 100 TABLET, FILM COATED ORAL at 05:24

## 2017-05-12 RX ADMIN — Medication 40 MILLIEQUIVALENT(S): at 12:16

## 2017-05-12 RX ADMIN — Medication 500 MILLIGRAM(S): at 05:24

## 2017-05-12 RX ADMIN — ENOXAPARIN SODIUM 30 MILLIGRAM(S): 100 INJECTION SUBCUTANEOUS at 05:24

## 2017-05-12 RX ADMIN — Medication 15 MILLIGRAM(S): at 05:27

## 2017-05-12 RX ADMIN — Medication 1 MILLIGRAM(S): at 12:16

## 2017-05-12 RX ADMIN — CLOPIDOGREL BISULFATE 75 MILLIGRAM(S): 75 TABLET, FILM COATED ORAL at 12:16

## 2017-05-12 RX ADMIN — AMLODIPINE BESYLATE 10 MILLIGRAM(S): 2.5 TABLET ORAL at 05:24

## 2017-05-12 RX ADMIN — Medication 325 MILLIGRAM(S): at 08:27

## 2017-05-12 RX ADMIN — Medication 325 MILLIGRAM(S): at 12:16

## 2017-05-12 RX ADMIN — Medication 1 TABLET(S): at 12:16

## 2017-05-12 RX ADMIN — Medication 15 MILLIGRAM(S): at 05:55

## 2017-05-12 RX ADMIN — Medication 100 MILLIGRAM(S): at 05:24

## 2017-05-12 NOTE — PROGRESS NOTE ADULT - PROBLEM SELECTOR PROBLEM 1
Primary osteoarthritis of left knee
Acute postoperative pain of knee, left
Acute postoperative pain of knee, left
Primary osteoarthritis of left knee
Primary osteoarthritis of left knee

## 2017-05-12 NOTE — PROGRESS NOTE ADULT - SUBJECTIVE AND OBJECTIVE BOX
77yFemale    Diagnosis:  S/p Left Total Knee Replacement POD#3    Patient was seen and evaluated at bedside. Patient with no acute complaints.   Pain is  well controlled.  Denies CP/SOB, dyspnea, paresthesias, N/V/D, palpitations.     Vital Signs Last 24 Hrs  T(C): 37.2, Max: 37.9 (05-11 @ 21:58)  T(F): 98.9, Max: 100.2 (05-11 @ 21:58)  HR: 92 (78 - 92)  BP: 127/68 (105/85 - 136/63)  BP(mean): --  RR: 16 (16 - 17)  SpO2: 97% (97% - 98%)  I&O's Detail      Physical Exam:    General: AAOx3, NAD, resting comfortably in bed.    Left KNEE:  Dressing is C/D/I. Skin is pink and warm. Staples intact. No erythema. SILT.  Wound with no drainage, healing well.   Lower extremity:  No calf tenderness, calves are soft. 2+pulses. NVI. 5/5 Strength of EHL/TA/gastrocnemius B/L.  Good capillary refill.                           10.3   7.6   )-----------( 184      ( 12 May 2017 07:12 )             30.5     05-12    139  |  103  |  13  ----------------------------<  114<H>  3.3<L>   |  30  |  0.62    Ca    8.4      12 May 2017 07:12        Impression:  77yFemale S/p Left Total Knee Replacement POD#3  Plan:  -  Pain management  -  Dvt prophylaxis with Plavix and Lovenox  -  Daily Physical Theapy:  WBAT of the left lower extremity  -  Discharge planning: Home Vs. Rehab pending Physical therapy eval.
77yFemale    Diagnosis:  S/p Left Total Knee Replacement POD#_2_    Patient was seen and evaluated at bedside. Patient with no acute complaints.   Pain is  well controlled.  Denies CP/SOB, dyspnea, paresthesias, N/V/D, palpitations.     Vital Signs Last 24 Hrs  T(C): 36.8, Max: 37.4 (05-10 @ 21:30)  T(F): 98.3, Max: 99.3 (05-10 @ 21:30)  HR: 87 (80 - 102)  BP: 134/67 (93/58 - 156/73)  BP(mean): --  RR: 16 (16 - 17)  SpO2: 97% (97% - 100%)  I&O's Detail      Physical Exam:    General: AAOx3, NAD, resting comfortably in bed.    LEFT KNEE:  Dressing is C/D/I. Skin is pink and warm. Staples intact. No erythema. SILT.  Wound with no drainage, healing well.   Lower extremity:  No calf tenderness, calves are soft. 2+pulses. NVI. 5/5 Strength of EHL/TA/gastrocnemius B/L.  Good capillary refill.                           11.7   7.0   )-----------( 195      ( 10 May 2017 07:30 )             34.8     05-10    140  |  102  |  5<L>  ----------------------------<  111<H>  4.5   |  31  |  0.62    Ca    8.5      10 May 2017 07:30        Impression:  76yo Female S/p LEFT Total Knee Replacement POD#2  Plan:  -  Pain management  -  Dvt prophylaxis  -  Daily Physical Theapy:  WBAT  of the LEFT lower extremity  -  Discharge planning:  Rehab pending Physical therapy eval.  -  Case d/w 
Chief Complaint: 77 year old female s/p left TKR.  PT complaining of left knee pain - 6/10, Pt out of bed to chair with PT    Patient seen and examined at bedside      Allergies    No Known Allergies    Intolerances            Pain is __X_ sharp ____dull ___burning ___achy ___     Intensity: ____ mild _X___mod ____severe     Location _____surgical site _____cervical _____lumbar ____abd _____upper ext___X Left_lower ext ____other    Worse with ___X_activity ____movement _____physical therapy___ Rest    Improved with _X___medication ___X_rest ____physical therapy    PAIN MEDICATIONS:  HYDROmorphone PCA (1 mG/mL) 30milliLiter(s) PCA Continuous PCA Continuous  ondansetron Injectable 4milliGRAM(s) IV Push every 6 hours PRN  acetaminophen   Tablet 650milliGRAM(s) Oral every 6 hours PRN  ondansetron Injectable 4milliGRAM(s) IV Push every 6 hours PRN    Heme:  clopidogrel Tablet 75milliGRAM(s) Oral daily  enoxaparin Injectable 30milliGRAM(s) SubCutaneous every 12 hours    Antibiotics:    Cardiac:  losartan 25milliGRAM(s) Oral daily  amLODIPine   Tablet 10milliGRAM(s) Oral daily    Pulmonary:    Endocrine  atorvastatin 20milliGRAM(s) Oral at bedtime  insulin lispro (HumaLOG) corrective regimen sliding scale  SubCutaneous three times a day before meals  glucagon  Injectable 1milliGRAM(s) IntraMuscular once PRN    GI:  aluminum hydroxide/magnesium hydroxide/simethicone Suspension 30milliLiter(s) Oral four times a day PRN  magnesium hydroxide Suspension 30milliLiter(s) Oral daily PRN  senna 2Tablet(s) Oral at bedtime PRN  docusate sodium 100milliGRAM(s) Oral three times a day    All Other Meds:  naloxone Injectable 0.1milliGRAM(s) IV Push every 3 minutes PRN  sodium chloride 0.45%. 1000milliLiter(s) IV Continuous <Continuous>  ferrous    sulfate 325milliGRAM(s) Oral three times a day with meals  folic acid 1milliGRAM(s) Oral daily  multivitamin 1Tablet(s) Oral daily  ascorbic acid 500milliGRAM(s) Oral two times a day  dextrose 5%. 1000milliLiter(s) IV Continuous <Continuous>      REVIEW OF SYSTEMS:  CONSTITUTIONAL: Negative fever,chills  NECK: No pain or stiffness  GASTROINTESTINAL: No abdominal, No nausea, vomiting; No diarrhea or constipation.   GENITOURINARY: No dysuria, frequency, or incontinence  NEUROLOGICAL: No headaches, memory loss, loss of strength, numbness, or  SKIN: No itching, burning, rashes, or lesions   MUSCULOSKELETAL: No joint pain or swelling; No muscle, back, or extremity pain    PHYSICAL EXAM:    Vital Signs Last 24 Hrs  T(C): 36.8, Max: 36.9 (05-09 @ 11:35)  T(F): 98.2, Max: 98.5 (05-09 @ 11:35)  HR: 83 (54 - 83)  BP: 93/58 (93/58 - 158/69)  BP(mean): 87 (86 - 90)  RR: 17 (12 - 20)  SpO2: 100% (95% - 100%)    PAIN SCORE:    6/10     SCALE USED: (1-10 VNRS)       GENERAL: Comfortable, in no apparent distress  HEENT:  Atraumatic, Normocephalic, PERRL, EOMI  NECK: Supple  ABDOMEN: Soft, Nontender, Nondistended; Dressing C/D/I  BACK: No midline bony tenderness to palpation, no step off or deformity noted.   EXTREMITIES:  Left knee - decreased ROM, + pain on exertion, mild edema,   NEUROLOGIC: Awake, alert and oriented X3;  No focal deficits. Motor strength 5/5. Sensation intact to light touch  SKIN: Warm and Dry    LABS:                          11.7   7.0   )-----------( 195      ( 10 May 2017 07:30 )             34.8     05-10    140  |  102  |  5<L>  ----------------------------<  111<H>  4.5   |  31  |  0.62    Ca    8.5      10 May 2017 07:30            Drug Screen:        RADIOLOGY:      [ ]  [facility]  Reviewed and Copied to Chart77
Chief Complaint: 77 year old female, lying in bed, NAD. PT complaining of mild knee pain - 4/10 left.  Pt ambulating with PT.  No N/V    Allergies    No Known Allergies    Intolerances      MEDICATIONS  (STANDING):  losartan 25milliGRAM(s) Oral daily  atorvastatin 20milliGRAM(s) Oral at bedtime  clopidogrel Tablet 75milliGRAM(s) Oral daily  amLODIPine   Tablet 10milliGRAM(s) Oral daily  sodium chloride 0.45%. 1000milliLiter(s) IV Continuous <Continuous>  enoxaparin Injectable 30milliGRAM(s) SubCutaneous every 12 hours  docusate sodium 100milliGRAM(s) Oral three times a day  ferrous    sulfate 325milliGRAM(s) Oral three times a day with meals  folic acid 1milliGRAM(s) Oral daily  multivitamin 1Tablet(s) Oral daily  ascorbic acid 500milliGRAM(s) Oral two times a day  insulin lispro (HumaLOG) corrective regimen sliding scale  SubCutaneous three times a day before meals  dextrose 5%. 1000milliLiter(s) IV Continuous <Continuous>    MEDICATIONS  (PRN):  acetaminophen   Tablet 650milliGRAM(s) Oral every 6 hours PRN For Temp over 38.3 C (100.94 F)  aluminum hydroxide/magnesium hydroxide/simethicone Suspension 30milliLiter(s) Oral four times a day PRN Indigestion  ondansetron Injectable 4milliGRAM(s) IV Push every 6 hours PRN Nausea and/or Vomiting  magnesium hydroxide Suspension 30milliLiter(s) Oral daily PRN Constipation  senna 2Tablet(s) Oral at bedtime PRN Constipation  glucagon  Injectable 1milliGRAM(s) IntraMuscular once PRN Glucose LESS THAN 70 milligrams/deciliter  ketorolac   Injectable 15milliGRAM(s) IV Push every 6 hours PRN Moderate Pain (4 - 6)  oxyCODONE IR 10milliGRAM(s) Oral every 4 hours PRN Severe Pain (7 - 10)        Pain is _X__ sharp ____dull ___burning ___achy ___     Intensity: ___X_ mild ____mod ____severe     Location _____surgical site _____cervical _____lumbar ____abd _____upper ext__X __Left lower ext ____other    Worse with _X___activity ____movement _____physical therapy___ Rest    Improved with __X__medication ____rest ____physical therapy      REVIEW OF SYSTEMS:  CONSTITUTIONAL: Negative fever,chills  NECK: No pain or stiffness  RESPIRATORY: No cough, wheezing,  No shortness of breath  GASTROINTESTINAL: No abdominal, No nausea, vomiting; No diarrhea or constipation.   GENITOURINARY: No dysuria, frequency, or incontinence  NEUROLOGICAL: No headaches, memory loss, loss of strength, numbness, or  SKIN: No itching, burning, rashes, or lesions   MUSCULOSKELETAL:+ left knee pain    PHYSICAL EXAM:    Vital Signs Last 24 Hrs  T(C): 36.8, Max: 37.4 (05-10 @ 21:30)  T(F): 98.3, Max: 99.3 (05-10 @ 21:30)  HR: 87 (80 - 102)  BP: 134/67 (122/68 - 156/73)  BP(mean): --  RR: 16 (16 - 17)  SpO2: 97% (97% - 100%)    PAIN SCORE:         SCALE USED: (1-10 VNRS)       GENERAL: Comfortable, in no apparent distress  HEENT:  Atraumatic, Normocephalic, PERRL, EOMI  NECK: Supple  LUNG: Respiration even and unlabored, no distress noted  ABDOMEN: Soft, Nontender, Nondistended; Dressing C/D/I  BACK: No midline bony tenderness to palpation, no step off or deformity noted.   EXTREMITIES:  PARADA X 4; 2+ Peripheral Pulses, left knee - dressing cdi, +mild edema, +tenderness  NEUROLOGIC: Awake, alert and oriented X3;  No focal deficits. Motor strength 5/5. Sensation intact to light touch  SKIN: Warm and Dry    LABS:                          11.2   7.4   )-----------( 189      ( 11 May 2017 07:56 )             33.3     05-11    138  |  101  |  11  ----------------------------<  117<H>  3.5   |  30  |  0.64    Ca    8.6      11 May 2017 07:56                    RADIOLOGY:      [ ]  [place]  Reviewed and Copied to Chart
Ortho PA - Post Op Check - S/P -         76 y/o F s/p L TKA POD#1. Pt notes overall improvement. Denies any overnight complaints. Pt denies any CP/SoB/N/V/F/Chills/Numbness/tingling. Pt alert and comfortable with no complaints, pain controlled.     Vital Signs Last 24 Hrs  T(C): 36.8, Max: 36.8 (05-10 @ 06:15)  T(F): 98.2, Max: 98.2 (05-10 @ 06:15)  HR: 69 (68 - 69)  BP: 146/69 (146/69 - 150/62)  BP(mean): --  RR: 17 (16 - 17)  SpO2: 100% (99% - 100%)    Indwelling Catheter - Urethral: 3100 ml    Accordian: 540 ml    Estimated Blood Loss: 250 ml    PE: Gen: NAD, A&O x 3.   LLE: Dressing c/d/i, HV intact on active suction. No deformity, No erythema/ecchymosis. Non tender. Calves soft/NT, NVi, SILT, 2+ Pulses. (+) EHL/FHL/ADF/APF.                               11.7   7.0   )-----------( 195      ( 10 May 2017 07:30 )             34.8        05-10    140  |  102  |  5<L>  ----------------------------<  111<H>  4.5   |  31  |  0.62    Ca    8.5      10 May 2017 07:30    A/P: 76 y/o F s/p L TKA POD#1  -Pain control w/PCA  -DVT ppx-plavix/lovenox/venodynes  -Daily PT-WBAT to LLE  -D/c mixon and HV today  -Incentive spirometer  -C/w 24 hr IV antibx postop  -Heel bump to LLE  -Discharge planning

## 2017-05-17 DIAGNOSIS — I10 ESSENTIAL (PRIMARY) HYPERTENSION: ICD-10-CM

## 2017-05-17 DIAGNOSIS — E78.5 HYPERLIPIDEMIA, UNSPECIFIED: ICD-10-CM

## 2017-05-17 DIAGNOSIS — Z90.710 ACQUIRED ABSENCE OF BOTH CERVIX AND UTERUS: ICD-10-CM

## 2017-05-17 DIAGNOSIS — Z98.42 CATARACT EXTRACTION STATUS, LEFT EYE: ICD-10-CM

## 2017-05-17 DIAGNOSIS — E66.9 OBESITY, UNSPECIFIED: ICD-10-CM

## 2017-05-17 DIAGNOSIS — Z98.41 CATARACT EXTRACTION STATUS, RIGHT EYE: ICD-10-CM

## 2017-05-17 DIAGNOSIS — Z98.890 OTHER SPECIFIED POSTPROCEDURAL STATES: ICD-10-CM

## 2017-05-17 DIAGNOSIS — Z90.49 ACQUIRED ABSENCE OF OTHER SPECIFIED PARTS OF DIGESTIVE TRACT: ICD-10-CM

## 2017-05-17 DIAGNOSIS — Z90.11 ACQUIRED ABSENCE OF RIGHT BREAST AND NIPPLE: ICD-10-CM

## 2017-05-17 DIAGNOSIS — M17.12 UNILATERAL PRIMARY OSTEOARTHRITIS, LEFT KNEE: ICD-10-CM

## 2017-05-17 DIAGNOSIS — Q74.1 CONGENITAL MALFORMATION OF KNEE: ICD-10-CM

## 2017-05-17 DIAGNOSIS — G89.18 OTHER ACUTE POSTPROCEDURAL PAIN: ICD-10-CM

## 2017-05-17 DIAGNOSIS — Z86.73 PERSONAL HISTORY OF TRANSIENT ISCHEMIC ATTACK (TIA), AND CEREBRAL INFARCTION WITHOUT RESIDUAL DEFICITS: ICD-10-CM

## 2017-05-17 DIAGNOSIS — M85.462: ICD-10-CM

## 2017-05-17 DIAGNOSIS — E11.9 TYPE 2 DIABETES MELLITUS WITHOUT COMPLICATIONS: ICD-10-CM

## 2017-05-19 DIAGNOSIS — Z79.84 LONG TERM (CURRENT) USE OF ORAL HYPOGLYCEMIC DRUGS: ICD-10-CM

## 2017-05-19 DIAGNOSIS — Z79.02 LONG TERM (CURRENT) USE OF ANTITHROMBOTICS/ANTIPLATELETS: ICD-10-CM

## 2019-10-01 PROBLEM — E66.9 OBESITY, UNSPECIFIED: Chronic | Status: ACTIVE | Noted: 2017-05-03

## 2019-10-01 PROBLEM — E78.5 HYPERLIPIDEMIA, UNSPECIFIED: Chronic | Status: ACTIVE | Noted: 2017-05-03

## 2019-10-01 PROBLEM — M17.12 UNILATERAL PRIMARY OSTEOARTHRITIS, LEFT KNEE: Chronic | Status: ACTIVE | Noted: 2017-05-03

## 2019-10-01 PROBLEM — I10 ESSENTIAL (PRIMARY) HYPERTENSION: Chronic | Status: ACTIVE | Noted: 2017-05-03

## 2019-10-01 PROBLEM — E11.9 TYPE 2 DIABETES MELLITUS WITHOUT COMPLICATIONS: Chronic | Status: ACTIVE | Noted: 2017-05-03

## 2019-10-08 ENCOUNTER — APPOINTMENT (OUTPATIENT)
Dept: SURGERY | Facility: CLINIC | Age: 79
End: 2019-10-08
Payer: MEDICARE

## 2019-10-08 VITALS
HEIGHT: 62 IN | TEMPERATURE: 98.4 F | SYSTOLIC BLOOD PRESSURE: 137 MMHG | BODY MASS INDEX: 33.13 KG/M2 | WEIGHT: 180 LBS | DIASTOLIC BLOOD PRESSURE: 74 MMHG | HEART RATE: 79 BPM

## 2019-10-08 DIAGNOSIS — Z87.19 PERSONAL HISTORY OF OTHER DISEASES OF THE DIGESTIVE SYSTEM: ICD-10-CM

## 2019-10-08 DIAGNOSIS — Z87.442 PERSONAL HISTORY OF URINARY CALCULI: ICD-10-CM

## 2019-10-08 DIAGNOSIS — Z86.79 PERSONAL HISTORY OF OTHER DISEASES OF THE CIRCULATORY SYSTEM: ICD-10-CM

## 2019-10-08 DIAGNOSIS — Z86.39 PERSONAL HISTORY OF OTHER ENDOCRINE, NUTRITIONAL AND METABOLIC DISEASE: ICD-10-CM

## 2019-10-08 DIAGNOSIS — Z83.3 FAMILY HISTORY OF DIABETES MELLITUS: ICD-10-CM

## 2019-10-08 DIAGNOSIS — Z86.73 PERSONAL HISTORY OF TRANSIENT ISCHEMIC ATTACK (TIA), AND CEREBRAL INFARCTION W/OUT RESIDUAL DEFICITS: ICD-10-CM

## 2019-10-08 DIAGNOSIS — Z78.9 OTHER SPECIFIED HEALTH STATUS: ICD-10-CM

## 2019-10-08 DIAGNOSIS — Z87.39 PERSONAL HISTORY OF OTHER DISEASES OF THE MUSCULOSKELETAL SYSTEM AND CONNECTIVE TISSUE: ICD-10-CM

## 2019-10-08 PROCEDURE — 99203 OFFICE O/P NEW LOW 30 MIN: CPT

## 2019-10-08 NOTE — PHYSICAL EXAM
[Normal Breath Sounds] : Normal breath sounds [Normal Rate and Rhythm] : normal rate and rhythm [No Rash or Lesion] : No rash or lesion [Alert] : alert [Oriented to Person] : oriented to person [Oriented to Place] : oriented to place [Oriented to Time] : oriented to time [Calm] : calm [JVD] : no jugular venous distention  [de-identified] : A/Ox3; NAD. appears comfortable [de-identified] : EOMI, sclera anicteric  [de-identified] : abd is obese, soft, NT/ND; she has previously healed surgical scars noted/keloids; no abdominal wall hernias felt  [de-identified] : +ROM, no joint swelling

## 2019-10-08 NOTE — REVIEW OF SYSTEMS
[Arthralgias] : arthralgias [Fever] : no fever [Chills] : no chills [Chest Pain] : no chest pain [Lower Ext Edema] : no lower extremity edema [Shortness Of Breath] : no shortness of breath [Wheezing] : no wheezing [Anxiety] : no anxiety [Muscle Weakness] : no muscle weakness [Swollen Glands] : no swollen glands [FreeTextEntry7] : complains of feeling an abdominal bulge to the L. side  [FreeTextEntry9] : has back pains  [de-identified] : keloids from previous surgical incisions

## 2019-10-08 NOTE — CONSULT LETTER
[Dear  ___] : Dear ~TEDDY, [Consult Letter:] : I had the pleasure of evaluating your patient, [unfilled]. [Sincerely,] : Sincerely, [Consult Closing:] : Thank you very much for allowing me to participate in the care of this patient.  If you have any questions, please do not hesitate to contact me. [FreeTextEntry3] : Frantz Farrell MD\par

## 2019-10-08 NOTE — HISTORY OF PRESENT ILLNESS
[de-identified] : 79 y.o F presents w the cc of having intermittent discomfort to the L. side of abdomen and feels a hardness. She reports having discomfort x 1 year. Her previous surgical hx includes hysterectomy, cholecystectomy and hx of hernia repairs x 3, in the past. \par CT of Abdomen was done 10/2018, results showed no intrahepatic biliary duct dilation, dilated CBD of 1.8 cm, s/p cholecystectomy, incompletely visualized is a hernia of the left lower abdominal wall. \par She also had a colonoscopy done, last year.

## 2019-11-11 ENCOUNTER — APPOINTMENT (OUTPATIENT)
Dept: SURGERY | Facility: CLINIC | Age: 79
End: 2019-11-11
Payer: MEDICARE

## 2019-11-11 VITALS
HEART RATE: 77 BPM | DIASTOLIC BLOOD PRESSURE: 87 MMHG | BODY MASS INDEX: 33.13 KG/M2 | SYSTOLIC BLOOD PRESSURE: 150 MMHG | TEMPERATURE: 98.3 F | WEIGHT: 180 LBS | HEIGHT: 62 IN

## 2019-11-11 DIAGNOSIS — R19.00 INTRA-ABDOMINAL AND PELVIC SWELLING, MASS AND LUMP, UNSPECIFIED SITE: ICD-10-CM

## 2019-11-11 PROCEDURE — 99213 OFFICE O/P EST LOW 20 MIN: CPT

## 2019-11-11 NOTE — REVIEW OF SYSTEMS
[Fever] : no fever [Chills] : no chills [Eyesight Problems] : no eyesight problems [Nosebleeds] : no nosebleeds [Lower Ext Edema] : no lower extremity edema [Chest Pain] : no chest pain [Shortness Of Breath] : no shortness of breath [Wheezing] : no wheezing [Pelvic Pain] : no pelvic pain [Joint Swelling] : no joint swelling [Joint Stiffness] : no joint stiffness [Skin Lesions] : no skin lesions [Skin Wound] : no skin wound [Anxiety] : no anxiety [Dizziness] : no dizziness [Muscle Weakness] : no muscle weakness [Swollen Glands] : no swollen glands [FreeTextEntry7] : has discomfort to the L. side of abdomen

## 2019-11-11 NOTE — PHYSICAL EXAM
[Normal Breath Sounds] : Normal breath sounds [Normal Rate and Rhythm] : normal rate and rhythm [Alert] : alert [No Rash or Lesion] : No rash or lesion [Oriented to Person] : oriented to person [Oriented to Place] : oriented to place [Oriented to Time] : oriented to time [Calm] : calm [JVD] : no jugular venous distention  [de-identified] : A/Ox3; NAD. appears comfortable [de-identified] : EOMI, sclera anicteric  [de-identified] : abd is obese, soft, NT/ND; she has previously healed surgical scars noted/keloids; bulge/incarcerated hernia felt to the L. side of abdominal wall  [de-identified] : +ROM, no joint swelling

## 2019-11-11 NOTE — ASSESSMENT
[FreeTextEntry1] : 79 y.o F presents w the cc of having discomfort to the L. side of abdominal wall, she has an incarcerated hernia/bulge felt to the L. side.

## 2019-11-11 NOTE — PLAN
[FreeTextEntry1] : pt with abdominal wall hernia to the L. side. Had previous mesh operations in the past.   Discussed the options with the pt. All the benefits and risks of the surgery including using mesh, recurrence, infection, bowel complication, infected mesh and other related complications were discussed. She wants to go ahead with the surgery. Will schedule at [facility]. \par PST/Medical clearance from PCP\par \par Patient's questions and concerns addressed to her satisfaction, and patient verbalized an understanding of the information discussed.\par \par

## 2019-11-11 NOTE — HISTORY OF PRESENT ILLNESS
[de-identified] : 79 y.o F presents for follow up visit, she c/o having intermittent discomfort to the L. side of abdomen and feels a hardness. She reports having discomfort x 1 year. Her previous surgical hx includes hysterectomy, cholecystectomy and hx of hernia repairs x 3, in the past. \par CT of Abdomen was done 10/2018, results showed no intrahepatic biliary duct dilation, dilated CBD of 1.8 cm, s/p cholecystectomy, incompletely visualized is a hernia of the left lower abdominal wall. She also had a colonoscopy done, last year. Repeat CT of Abd and Pelvis w contrast was ordered, 10/22/19; results c/w fat containing left abdominal wall hernia with a neck of 1.5 cm, small hiatal hernia, hepatic cyst and R. renal cyst.

## 2019-11-11 NOTE — DATA REVIEWED
[FreeTextEntry1] : CT Abd 10/2018, no intrahepatic biliary duct dilation, dilated CBD of 1.8 cm, s/p cholecystectomy, incompletely visualized is a hernia of the left lower abdominal wall. \par \par CT of Abd and Pelvis w contrast 10/22/19; fat containing left abdominal wall hernia with a neck of 1.5 cm, small hiatal hernia, hepatic cyst and R. renal cyst.

## 2019-11-20 ENCOUNTER — OUTPATIENT (OUTPATIENT)
Dept: OUTPATIENT SERVICES | Facility: HOSPITAL | Age: 79
LOS: 1 days | End: 2019-11-20
Payer: COMMERCIAL

## 2019-11-20 VITALS
HEART RATE: 86 BPM | OXYGEN SATURATION: 99 % | WEIGHT: 179.9 LBS | RESPIRATION RATE: 18 BRPM | TEMPERATURE: 98 F | DIASTOLIC BLOOD PRESSURE: 85 MMHG | SYSTOLIC BLOOD PRESSURE: 135 MMHG | HEIGHT: 62 IN

## 2019-11-20 DIAGNOSIS — Z98.890 OTHER SPECIFIED POSTPROCEDURAL STATES: Chronic | ICD-10-CM

## 2019-11-20 DIAGNOSIS — K43.0 INCISIONAL HERNIA WITH OBSTRUCTION, WITHOUT GANGRENE: Chronic | ICD-10-CM

## 2019-11-20 DIAGNOSIS — Z90.49 ACQUIRED ABSENCE OF OTHER SPECIFIED PARTS OF DIGESTIVE TRACT: Chronic | ICD-10-CM

## 2019-11-20 DIAGNOSIS — Z90.11 ACQUIRED ABSENCE OF RIGHT BREAST AND NIPPLE: Chronic | ICD-10-CM

## 2019-11-20 DIAGNOSIS — E11.9 TYPE 2 DIABETES MELLITUS WITHOUT COMPLICATIONS: ICD-10-CM

## 2019-11-20 DIAGNOSIS — K21.9 GASTRO-ESOPHAGEAL REFLUX DISEASE WITHOUT ESOPHAGITIS: ICD-10-CM

## 2019-11-20 DIAGNOSIS — K43.9 VENTRAL HERNIA WITHOUT OBSTRUCTION OR GANGRENE: ICD-10-CM

## 2019-11-20 DIAGNOSIS — Z01.818 ENCOUNTER FOR OTHER PREPROCEDURAL EXAMINATION: ICD-10-CM

## 2019-11-20 DIAGNOSIS — Z96.652 PRESENCE OF LEFT ARTIFICIAL KNEE JOINT: Chronic | ICD-10-CM

## 2019-11-20 DIAGNOSIS — G45.9 TRANSIENT CEREBRAL ISCHEMIC ATTACK, UNSPECIFIED: ICD-10-CM

## 2019-11-20 DIAGNOSIS — S86.002S UNSPECIFIED INJURY OF LEFT ACHILLES TENDON, SEQUELA: Chronic | ICD-10-CM

## 2019-11-20 DIAGNOSIS — I10 ESSENTIAL (PRIMARY) HYPERTENSION: ICD-10-CM

## 2019-11-20 DIAGNOSIS — H33.21 SEROUS RETINAL DETACHMENT, RIGHT EYE: Chronic | ICD-10-CM

## 2019-11-20 DIAGNOSIS — H26.9 UNSPECIFIED CATARACT: Chronic | ICD-10-CM

## 2019-11-20 DIAGNOSIS — M19.90 UNSPECIFIED OSTEOARTHRITIS, UNSPECIFIED SITE: ICD-10-CM

## 2019-11-20 DIAGNOSIS — Z90.710 ACQUIRED ABSENCE OF BOTH CERVIX AND UTERUS: Chronic | ICD-10-CM

## 2019-11-20 DIAGNOSIS — E78.5 HYPERLIPIDEMIA, UNSPECIFIED: ICD-10-CM

## 2019-11-20 PROCEDURE — G0463: CPT

## 2019-11-20 RX ORDER — FERROUS SULFATE 325(65) MG
1 TABLET ORAL
Qty: 0 | Refills: 0 | DISCHARGE

## 2019-11-20 RX ORDER — ENOXAPARIN SODIUM 100 MG/ML
30 INJECTION SUBCUTANEOUS
Qty: 0 | Refills: 0 | DISCHARGE

## 2019-11-20 RX ORDER — UBIDECARENONE 100 MG
1 CAPSULE ORAL
Qty: 0 | Refills: 0 | DISCHARGE

## 2019-11-20 RX ORDER — CHOLECALCIFEROL (VITAMIN D3) 125 MCG
1 CAPSULE ORAL
Qty: 0 | Refills: 0 | DISCHARGE

## 2019-11-20 RX ORDER — DOCUSATE SODIUM 100 MG
1 CAPSULE ORAL
Qty: 0 | Refills: 0 | DISCHARGE

## 2019-11-20 RX ORDER — SENNA PLUS 8.6 MG/1
1 TABLET ORAL
Qty: 0 | Refills: 0 | DISCHARGE

## 2019-11-20 NOTE — H&P PST ADULT - NSICDXPASTSURGICALHX_GEN_ALL_CORE_FT
PAST SURGICAL HISTORY:  Cataracts, bilateral excised in 2010    Detached retina, right repaired in 2011    History of appendectomy 12 years old    History of lumpectomy of right breast 2001    History of tonsillectomy and adenoidectomy bilateral myringotomy    History of total left knee replacement 5/9/2017    Incisional hernia with obstruction but no gangrene x3    Injury of Achilles tendon, left, sequela s/p repair of left achilles tendon 6/17/2007    S/P laparoscopic cholecystectomy 2010    S/P NAVJOT-BSO (total abdominal hysterectomy and bilateral salpingo-oophorectomy) 1993 (due to fibroids)

## 2019-11-20 NOTE — H&P PST ADULT - RS GEN PE MLT RESP DETAILS PC
clear to auscultation bilaterally/good air movement/airway patent/no chest wall tenderness/no intercostal retractions/normal/respirations non-labored/no subcutaneous emphysema/no wheezes/no rhonchi/breath sounds equal/no rales

## 2019-11-20 NOTE — H&P PST ADULT - NEGATIVE OPHTHALMOLOGIC SYMPTOMS
no lacrimation R/no blurred vision L/no lacrimation L/no discharge R/no pain L/no irritation L/no scleral injection R/no irritation R/no loss of vision L/no scleral injection L

## 2019-11-20 NOTE — H&P PST ADULT - NSICDXPROBLEM_GEN_ALL_CORE_FT
PROBLEM DIAGNOSES  Problem: Abdominal wall hernia  Assessment and Plan:  Repair of left side recurrent abdominal wall hernia on 12/4/2019. Preoperative instructions discussed and given to pt. Verbalized understanding of instructions given. PROBLEM DIAGNOSES  Problem: Osteoarthritis  Assessment and Plan: Instructed to avoid NSAIDs 1 week before surgery.  Advised to take Tylenol as needed for pain.     Problem: HLD (hyperlipidemia)  Assessment and Plan: Instructed pt to continue Rosuvastatin. Follow-up with PCP postop for lipid management     Problem: HTN (hypertension)  Assessment and Plan: Instructed to continue antihypertensive meds and take with sips of water on day of surgery. Pt to see PCP on 11/25/19 for MC. Follow-up with PCP postop for management.     Problem: GERD (gastroesophageal reflux disease)  Assessment and Plan: Instructed to continue meds and take with sips of water on day of surgery. Follow-up with provider postop for management.     Problem: DM (diabetes mellitus)  Assessment and Plan: Instructed to continue antidiabetic meds and hold on day of surgery. Perioperative glucose monitoring and coverage as needed. Follow-up with PCP for diabetic management     Problem: Transient ischemic attack (TIA)  Assessment and Plan: Follow-up with PCP for management. Pt on Plavix for TIA. As per pt, she stopped it yesterday.    Problem: Abdominal wall hernia  Assessment and Plan:  Repair of left side recurrent abdominal wall hernia on 12/4/2019. Preoperative instructions discussed and given to pt. Verbalized understanding of instructions given.

## 2019-11-20 NOTE — H&P PST ADULT - HISTORY OF PRESENT ILLNESS
79 yr old yr old with PMH of presents with c/o intermittent abdominal pain due to ventral hernia. Pt reports worsening of pain with activity and after ingestion of meals. Pt for hernia repair on 79 yr old female with PMH of TIA, Diabetes, GERD, HTN, HLD presents with c/o intermittent abdominal pain due to left side abdominal hernia. Pt reports worsening of pain with activity and after ingestion of meals. Pt for repair of left side recurrent abdominal wall hernia on 12/4/2019. 79 yr old female with PMH of TIA, Diabetes, GERD, HTN, HLD, OA presents with c/o intermittent abdominal pain due to left side abdominal hernia. Pt reports worsening of pain with activity and after ingestion of meals. Pt for repair of left side recurrent abdominal wall hernia on 12/4/2019.

## 2019-11-20 NOTE — H&P PST ADULT - NEGATIVE NEUROLOGICAL SYMPTOMS
no focal seizures/no tremors/no vertigo/no syncope/no headache/no loss of sensation/no difficulty walking

## 2019-11-20 NOTE — H&P PST ADULT - NEGATIVE GASTROINTESTINAL SYMPTOMS
no flatulence/no nausea/no constipation/no abdominal pain/no change in bowel habits/no vomiting/no diarrhea

## 2019-11-20 NOTE — H&P PST ADULT - NEGATIVE ALLERGY TYPES
no reactions to insect bites/no outdoor environmental allergies/no indoor environmental allergies/no reactions to food/no reactions to animals

## 2019-11-20 NOTE — H&P PST ADULT - NEGATIVE CARDIOVASCULAR SYMPTOMS
no paroxysmal nocturnal dyspnea/no orthopnea/no palpitations/no dyspnea on exertion/no peripheral edema/no chest pain/no claudication

## 2019-11-20 NOTE — H&P PST ADULT - ASSESSMENT
79 yr old female with PMH of TIA, Diabetes, GERD, HTN, HLD presents with c/o intermittent abdominal pain due to left side abdominal hernia. Pt reports worsening of pain with activity and after ingestion of meals. Pt for repair of left side recurrent abdominal wall hernia on 12/4/2019. 79 yr old female with PMH of TIA, Diabetes, GERD, HTN, HLD presents with left side abdominal wall hernia.  Pt is scheduled for repair of left side recurrent abdominal wall hernia on 12/4/2019.

## 2019-11-20 NOTE — H&P PST ADULT - NSICDXFAMILYHX_GEN_ALL_CORE_FT
FAMILY HISTORY:  Family history of diabetes mellitus, sister and brother    Father  Still living? No  Family history of cirrhosis of liver, Age at diagnosis: Age Unknown    Mother  Still living? No  Family history of Alzheimer's disease, Age at diagnosis: Age Unknown

## 2019-11-20 NOTE — H&P PST ADULT - NSICDXPASTMEDICALHX_GEN_ALL_CORE_FT
PAST MEDICAL HISTORY:  Diabetes mellitus, type 2     Essential (primary) hypertension     Hyperlipidemia, unspecified hyperlipidemia type     Obesity (BMI 30-39.9)     Primary osteoarthritis of left knee PAST MEDICAL HISTORY:  Diabetes mellitus, type 2     Essential (primary) hypertension     GERD (gastroesophageal reflux disease)     Hyperlipidemia, unspecified hyperlipidemia type     Obesity (BMI 30-39.9)     Primary osteoarthritis of left knee PAST MEDICAL HISTORY:  Diabetes mellitus, type 2     Essential (primary) hypertension     GERD (gastroesophageal reflux disease)     Hyperlipidemia, unspecified hyperlipidemia type     Obesity (BMI 30-39.9)     Primary osteoarthritis of left knee     Transient ischemic attack (TIA) 2004

## 2019-12-03 ENCOUNTER — TRANSCRIPTION ENCOUNTER (OUTPATIENT)
Age: 79
End: 2019-12-03

## 2019-12-04 ENCOUNTER — APPOINTMENT (OUTPATIENT)
Dept: SURGERY | Facility: HOSPITAL | Age: 79
End: 2019-12-04

## 2019-12-04 ENCOUNTER — RESULT REVIEW (OUTPATIENT)
Age: 79
End: 2019-12-04

## 2019-12-04 ENCOUNTER — OUTPATIENT (OUTPATIENT)
Dept: OUTPATIENT SERVICES | Facility: HOSPITAL | Age: 79
LOS: 1 days | End: 2019-12-04
Payer: COMMERCIAL

## 2019-12-04 VITALS
TEMPERATURE: 98 F | HEIGHT: 62 IN | WEIGHT: 179.9 LBS | HEART RATE: 68 BPM | RESPIRATION RATE: 18 BRPM | SYSTOLIC BLOOD PRESSURE: 136 MMHG | DIASTOLIC BLOOD PRESSURE: 71 MMHG | OXYGEN SATURATION: 100 %

## 2019-12-04 VITALS
OXYGEN SATURATION: 100 % | RESPIRATION RATE: 17 BRPM | DIASTOLIC BLOOD PRESSURE: 68 MMHG | SYSTOLIC BLOOD PRESSURE: 119 MMHG | TEMPERATURE: 98 F | HEART RATE: 72 BPM

## 2019-12-04 DIAGNOSIS — Z90.49 ACQUIRED ABSENCE OF OTHER SPECIFIED PARTS OF DIGESTIVE TRACT: Chronic | ICD-10-CM

## 2019-12-04 DIAGNOSIS — Z90.710 ACQUIRED ABSENCE OF BOTH CERVIX AND UTERUS: Chronic | ICD-10-CM

## 2019-12-04 DIAGNOSIS — Z90.11 ACQUIRED ABSENCE OF RIGHT BREAST AND NIPPLE: Chronic | ICD-10-CM

## 2019-12-04 DIAGNOSIS — K43.9 VENTRAL HERNIA WITHOUT OBSTRUCTION OR GANGRENE: ICD-10-CM

## 2019-12-04 DIAGNOSIS — H26.9 UNSPECIFIED CATARACT: Chronic | ICD-10-CM

## 2019-12-04 DIAGNOSIS — K43.0 INCISIONAL HERNIA WITH OBSTRUCTION, WITHOUT GANGRENE: Chronic | ICD-10-CM

## 2019-12-04 DIAGNOSIS — Z98.890 OTHER SPECIFIED POSTPROCEDURAL STATES: Chronic | ICD-10-CM

## 2019-12-04 DIAGNOSIS — H33.21 SEROUS RETINAL DETACHMENT, RIGHT EYE: Chronic | ICD-10-CM

## 2019-12-04 DIAGNOSIS — S86.002S UNSPECIFIED INJURY OF LEFT ACHILLES TENDON, SEQUELA: Chronic | ICD-10-CM

## 2019-12-04 DIAGNOSIS — Z96.652 PRESENCE OF LEFT ARTIFICIAL KNEE JOINT: Chronic | ICD-10-CM

## 2019-12-04 LAB
GLUCOSE BLDC GLUCOMTR-MCNC: 101 MG/DL — HIGH (ref 70–99)
GLUCOSE BLDC GLUCOMTR-MCNC: 96 MG/DL — SIGNIFICANT CHANGE UP (ref 70–99)

## 2019-12-04 PROCEDURE — 49568: CPT

## 2019-12-04 PROCEDURE — 88305 TISSUE EXAM BY PATHOLOGIST: CPT

## 2019-12-04 PROCEDURE — 49566: CPT

## 2019-12-04 PROCEDURE — 88305 TISSUE EXAM BY PATHOLOGIST: CPT | Mod: 26

## 2019-12-04 PROCEDURE — 82962 GLUCOSE BLOOD TEST: CPT

## 2019-12-04 RX ORDER — FENTANYL CITRATE 50 UG/ML
25 INJECTION INTRAVENOUS
Refills: 0 | Status: DISCONTINUED | OUTPATIENT
Start: 2019-12-04 | End: 2019-12-05

## 2019-12-04 RX ORDER — SODIUM CHLORIDE 9 MG/ML
3 INJECTION INTRAMUSCULAR; INTRAVENOUS; SUBCUTANEOUS EVERY 8 HOURS
Refills: 0 | Status: DISCONTINUED | OUTPATIENT
Start: 2019-12-04 | End: 2019-12-04

## 2019-12-04 RX ORDER — SODIUM CHLORIDE 9 MG/ML
1000 INJECTION, SOLUTION INTRAVENOUS
Refills: 0 | Status: DISCONTINUED | OUTPATIENT
Start: 2019-12-04 | End: 2019-12-05

## 2019-12-04 RX ORDER — METFORMIN HYDROCHLORIDE 850 MG/1
1 TABLET ORAL
Qty: 0 | Refills: 0 | DISCHARGE

## 2019-12-04 RX ORDER — OXYCODONE AND ACETAMINOPHEN 5; 325 MG/1; MG/1
1 TABLET ORAL
Qty: 20 | Refills: 0
Start: 2019-12-04 | End: 2019-12-08

## 2019-12-04 RX ORDER — OXYCODONE AND ACETAMINOPHEN 5; 325 MG/1; MG/1
1 TABLET ORAL ONCE
Refills: 0 | Status: DISCONTINUED | OUTPATIENT
Start: 2019-12-04 | End: 2019-12-04

## 2019-12-04 RX ORDER — ACETAMINOPHEN 500 MG
1000 TABLET ORAL ONCE
Refills: 0 | Status: DISCONTINUED | OUTPATIENT
Start: 2019-12-04 | End: 2019-12-05

## 2019-12-04 RX ORDER — FENTANYL CITRATE 50 UG/ML
50 INJECTION INTRAVENOUS
Refills: 0 | Status: DISCONTINUED | OUTPATIENT
Start: 2019-12-04 | End: 2019-12-05

## 2019-12-04 RX ADMIN — FENTANYL CITRATE 25 MICROGRAM(S): 50 INJECTION INTRAVENOUS at 14:46

## 2019-12-04 RX ADMIN — FENTANYL CITRATE 25 MICROGRAM(S): 50 INJECTION INTRAVENOUS at 14:31

## 2019-12-04 NOTE — ASU DISCHARGE PLAN (ADULT/PEDIATRIC) - CALL YOUR DOCTOR IF YOU HAVE ANY OF THE FOLLOWING:
Inability to tolerate liquids or foods/Bleeding that does not stop/Wound/Surgical Site with redness, or foul smelling discharge or pus/Swelling that gets worse/Nausea and vomiting that does not stop/Excessive diarrhea/Pain not relieved by Medications/Numbness, tingling, color or temperature change to extremity/Fever greater than (need to indicate Fahrenheit or Celsius)

## 2019-12-04 NOTE — ASU PATIENT PROFILE, ADULT - PSH
Cataracts, bilateral  excised in 2010  Detached retina, right  repaired in 2011  History of appendectomy  12 years old  History of lumpectomy of right breast  2001  History of tonsillectomy and adenoidectomy  bilateral myringotomy  History of total left knee replacement  5/9/2017  Incisional hernia with obstruction but no gangrene  x3  Injury of Achilles tendon, left, sequela  s/p repair of left achilles tendon 6/17/2007  S/P laparoscopic cholecystectomy  2010  S/P NAVJOT-BSO (total abdominal hysterectomy and bilateral salpingo-oophorectomy)  1993 (due to fibroids)

## 2019-12-04 NOTE — ASU PATIENT PROFILE, ADULT - PMH
Diabetes mellitus, type 2    Essential (primary) hypertension    GERD (gastroesophageal reflux disease)    Hyperlipidemia, unspecified hyperlipidemia type    Obesity (BMI 30-39.9)    Primary osteoarthritis of left knee    Transient ischemic attack (TIA)  2004

## 2019-12-04 NOTE — ASU DISCHARGE PLAN (ADULT/PEDIATRIC) - CARE PROVIDER_API CALL
Frantz Farrell (MD)  Surgery  [address] [place], [place] [id number]  Phone: [phone]  Fax: [phone]  Follow Up Time:

## 2019-12-04 NOTE — BRIEF OPERATIVE NOTE - OPERATION/FINDINGS
Incarcerated hernia of left abdominal wall, narrow neck, with hernia sac containing omentum. No strangulation or signs of ischemia

## 2019-12-04 NOTE — ASU DISCHARGE PLAN (ADULT/PEDIATRIC) - ASU DC SPECIAL INSTRUCTIONSFT
Wear abdominal binder for comfort until clinic appointment  Take pain medication as needed  Do not lift more than 10 pounds for 4 weeks, or until cleared by surgeon Wear abdominal binder for comfort until clinic appointment  Take pain medication as needed  Do not take tylenol while taking percocet   Do not lift more than 10 pounds for 4 weeks, or until cleared by surgeon

## 2019-12-05 PROBLEM — K21.9 GASTRO-ESOPHAGEAL REFLUX DISEASE WITHOUT ESOPHAGITIS: Chronic | Status: ACTIVE | Noted: 2019-11-20

## 2019-12-05 PROBLEM — G45.9 TRANSIENT CEREBRAL ISCHEMIC ATTACK, UNSPECIFIED: Chronic | Status: ACTIVE | Noted: 2019-11-20

## 2019-12-09 LAB — SURGICAL PATHOLOGY STUDY: SIGNIFICANT CHANGE UP

## 2019-12-12 ENCOUNTER — APPOINTMENT (OUTPATIENT)
Dept: SURGERY | Facility: CLINIC | Age: 79
End: 2019-12-12
Payer: MEDICARE

## 2019-12-12 PROCEDURE — 99024 POSTOP FOLLOW-UP VISIT: CPT

## 2019-12-12 NOTE — CONSULT LETTER
[Dear  ___] : Dear ~TEDDY, [Consult Letter:] : I had the pleasure of evaluating your patient, [unfilled]. [Consult Closing:] : Thank you very much for allowing me to participate in the care of this patient.  If you have any questions, please do not hesitate to contact me. [Sincerely,] : Sincerely, [FreeTextEntry3] : Frantz Farrell MD\par

## 2019-12-12 NOTE — REASON FOR VISIT
[Post Op: _________] : a [unfilled] post op visit [FreeTextEntry1] : s/p repair of abdominal wall hernia, 12/04/19

## 2019-12-12 NOTE — ASSESSMENT
[FreeTextEntry1] : 79 y.o F s/p repair of abdominal wall hernia, 12/04/19. Path results c/w adipose tissue, c/w hernia. Patient offers no complaints. Denies fever, chills, or pain. Surgical wound is healing well. No signs of inflammation, infection or exudate. no recurrence. Patient reports good bowel movements and appetite.\par

## 2019-12-12 NOTE — PHYSICAL EXAM
[Normal Rate and Rhythm] : normal rate and rhythm [Normal Breath Sounds] : Normal breath sounds [Alert] : alert [Oriented to Person] : oriented to person [No Rash or Lesion] : No rash or lesion [Oriented to Place] : oriented to place [Oriented to Time] : oriented to time [Calm] : calm [de-identified] : A/Ox3; NAD. appears comfortable [de-identified] : abd is soft, NT/ND\par incision site is healing well, no evidence of infection, no recurrence felt

## 2019-12-12 NOTE — PLAN
[FreeTextEntry1] : patient will follow up next week for staple removal. Warning signs, follow up, and restrictions were discussed with the patient.\par

## 2019-12-12 NOTE — HISTORY OF PRESENT ILLNESS
[de-identified] : 79 y.o F s/p repair of abdominal wall hernia, 12/04/19. Path results c/w adipose tissue, c/w hernia. Patient offers no complaints. Denies fever, chills, or pain. Surgical wound is healing well. No signs of inflammation, infection or exudate. no recurrence. Patient reports good bowel movements and appetite.\par \par

## 2019-12-19 ENCOUNTER — APPOINTMENT (OUTPATIENT)
Dept: SURGERY | Facility: CLINIC | Age: 79
End: 2019-12-19
Payer: MEDICARE

## 2019-12-19 PROCEDURE — 99024 POSTOP FOLLOW-UP VISIT: CPT

## 2019-12-19 NOTE — CONSULT LETTER
[Dear  ___] : Dear ~TEDDY, [Consult Closing:] : Thank you very much for allowing me to participate in the care of this patient.  If you have any questions, please do not hesitate to contact me. [Consult Letter:] : I had the pleasure of evaluating your patient, [unfilled]. [Sincerely,] : Sincerely, [FreeTextEntry3] : Frantz Farrell MD\par

## 2019-12-19 NOTE — ASSESSMENT
[FreeTextEntry1] : 79 y.o F s/p repair of abdominal wall hernia, 12/04/19. Path results c/w adipose tissue, c/w hernia. patient without reported complaints. denies fever, chills, or pain. Surgical wounds are healing well. No signs of inflammation, infection or exudate. no recurrence \par \par Patient is doing well, with excellent post-operative recovery. Incision site is healing well and as expected. There is no evidence of infection or complication, and patient is progressing as expected.\par

## 2019-12-19 NOTE — PHYSICAL EXAM
[Normal Breath Sounds] : Normal breath sounds [No Rash or Lesion] : No rash or lesion [Normal Rate and Rhythm] : normal rate and rhythm [Alert] : alert [Oriented to Place] : oriented to place [Oriented to Person] : oriented to person [Calm] : calm [Oriented to Time] : oriented to time [de-identified] : abd is soft, NT/ND\par Surgical wound healing well. No signs of inflammation, infection or exudate. no recurrence  [de-identified] : A/Ox3; NAD. appears comfortable [JVD] : no jugular venous distention

## 2019-12-19 NOTE — HISTORY OF PRESENT ILLNESS
[de-identified] : 79 y.o F s/p repair of abdominal wall hernia, 12/04/19. Path results c/w adipose tissue, c/w hernia. patient without reported complaints. denies fever, chills, or pain. Surgical wounds are healing well. No signs of inflammation, infection or exudate. no recurrence \par \par \par \par

## 2019-12-19 NOTE — PLAN
[FreeTextEntry1] : staples removed \par Post operative wound care, activity and restrictions/precautions were reinforced. \par Patient was instructed to refrain from any heavy lifting >10-15 lbs for at least 4 weeks post operatively. \par \par Patient's questions and concerns addressed.\par \par patient will follow up if needed. Warning signs, follow up, and restrictions were discussed with the patient.\par

## 2021-07-13 NOTE — H&P PST ADULT - MUSCULOSKELETAL
The patient has had several months of fatigue. Patient feels they're getting adequate sleep and feels refreshed in the morning. No history of MELIDA symptoms.No blood in the stool or dark tarry stools. Does have a history of seasonal allergies  Patient has been using over-the-counter medications allergie medications and doing a saline wash that has helped with his fatigue.          details… No joint pain, swelling or deformity; no limitation of movement

## 2021-08-08 ENCOUNTER — EMERGENCY (EMERGENCY)
Facility: HOSPITAL | Age: 81
LOS: 1 days | Discharge: HOME | End: 2021-08-08
Attending: EMERGENCY MEDICINE
Payer: COMMERCIAL

## 2021-08-08 VITALS
RESPIRATION RATE: 16 BRPM | DIASTOLIC BLOOD PRESSURE: 75 MMHG | HEART RATE: 72 BPM | HEIGHT: 62 IN | SYSTOLIC BLOOD PRESSURE: 142 MMHG | TEMPERATURE: 98 F | OXYGEN SATURATION: 98 % | WEIGHT: 186.07 LBS

## 2021-08-08 DIAGNOSIS — Z90.49 ACQUIRED ABSENCE OF OTHER SPECIFIED PARTS OF DIGESTIVE TRACT: Chronic | ICD-10-CM

## 2021-08-08 DIAGNOSIS — Z90.710 ACQUIRED ABSENCE OF BOTH CERVIX AND UTERUS: Chronic | ICD-10-CM

## 2021-08-08 DIAGNOSIS — K43.0 INCISIONAL HERNIA WITH OBSTRUCTION, WITHOUT GANGRENE: Chronic | ICD-10-CM

## 2021-08-08 DIAGNOSIS — Z98.890 OTHER SPECIFIED POSTPROCEDURAL STATES: Chronic | ICD-10-CM

## 2021-08-08 DIAGNOSIS — H33.21 SEROUS RETINAL DETACHMENT, RIGHT EYE: Chronic | ICD-10-CM

## 2021-08-08 DIAGNOSIS — Z90.11 ACQUIRED ABSENCE OF RIGHT BREAST AND NIPPLE: Chronic | ICD-10-CM

## 2021-08-08 DIAGNOSIS — H26.9 UNSPECIFIED CATARACT: Chronic | ICD-10-CM

## 2021-08-08 DIAGNOSIS — Z96.652 PRESENCE OF LEFT ARTIFICIAL KNEE JOINT: Chronic | ICD-10-CM

## 2021-08-08 DIAGNOSIS — S86.002S UNSPECIFIED INJURY OF LEFT ACHILLES TENDON, SEQUELA: Chronic | ICD-10-CM

## 2021-08-08 LAB
ALBUMIN SERPL ELPH-MCNC: 3.1 G/DL — LOW (ref 3.5–5)
ALP SERPL-CCNC: 87 U/L — SIGNIFICANT CHANGE UP (ref 40–120)
ALT FLD-CCNC: 22 U/L DA — SIGNIFICANT CHANGE UP (ref 10–60)
ANION GAP SERPL CALC-SCNC: 3 MMOL/L — LOW (ref 5–17)
APTT BLD: 23 SEC — LOW (ref 27.5–35.5)
AST SERPL-CCNC: 17 U/L — SIGNIFICANT CHANGE UP (ref 10–40)
BASOPHILS # BLD AUTO: 0.03 K/UL — SIGNIFICANT CHANGE UP (ref 0–0.2)
BASOPHILS NFR BLD AUTO: 0.5 % — SIGNIFICANT CHANGE UP (ref 0–2)
BILIRUB SERPL-MCNC: 0.6 MG/DL — SIGNIFICANT CHANGE UP (ref 0.2–1.2)
BUN SERPL-MCNC: 12 MG/DL — SIGNIFICANT CHANGE UP (ref 7–18)
CALCIUM SERPL-MCNC: 8.5 MG/DL — SIGNIFICANT CHANGE UP (ref 8.4–10.5)
CHLORIDE SERPL-SCNC: 112 MMOL/L — HIGH (ref 96–108)
CO2 SERPL-SCNC: 27 MMOL/L — SIGNIFICANT CHANGE UP (ref 22–31)
CREAT SERPL-MCNC: 0.73 MG/DL — SIGNIFICANT CHANGE UP (ref 0.5–1.3)
EOSINOPHIL # BLD AUTO: 0.1 K/UL — SIGNIFICANT CHANGE UP (ref 0–0.5)
EOSINOPHIL NFR BLD AUTO: 1.6 % — SIGNIFICANT CHANGE UP (ref 0–6)
GLUCOSE SERPL-MCNC: 113 MG/DL — HIGH (ref 70–99)
HCT VFR BLD CALC: 33.7 % — LOW (ref 34.5–45)
HGB BLD-MCNC: 11.2 G/DL — LOW (ref 11.5–15.5)
IMM GRANULOCYTES NFR BLD AUTO: 0.2 % — SIGNIFICANT CHANGE UP (ref 0–1.5)
INR BLD: 0.92 RATIO — SIGNIFICANT CHANGE UP (ref 0.88–1.16)
LYMPHOCYTES # BLD AUTO: 1.68 K/UL — SIGNIFICANT CHANGE UP (ref 1–3.3)
LYMPHOCYTES # BLD AUTO: 27.4 % — SIGNIFICANT CHANGE UP (ref 13–44)
MCHC RBC-ENTMCNC: 28.4 PG — SIGNIFICANT CHANGE UP (ref 27–34)
MCHC RBC-ENTMCNC: 33.2 GM/DL — SIGNIFICANT CHANGE UP (ref 32–36)
MCV RBC AUTO: 85.3 FL — SIGNIFICANT CHANGE UP (ref 80–100)
MONOCYTES # BLD AUTO: 0.47 K/UL — SIGNIFICANT CHANGE UP (ref 0–0.9)
MONOCYTES NFR BLD AUTO: 7.7 % — SIGNIFICANT CHANGE UP (ref 2–14)
NEUTROPHILS # BLD AUTO: 3.85 K/UL — SIGNIFICANT CHANGE UP (ref 1.8–7.4)
NEUTROPHILS NFR BLD AUTO: 62.6 % — SIGNIFICANT CHANGE UP (ref 43–77)
NRBC # BLD: 0 /100 WBCS — SIGNIFICANT CHANGE UP (ref 0–0)
PLATELET # BLD AUTO: 258 K/UL — SIGNIFICANT CHANGE UP (ref 150–400)
POTASSIUM SERPL-MCNC: 4 MMOL/L — SIGNIFICANT CHANGE UP (ref 3.5–5.3)
POTASSIUM SERPL-SCNC: 4 MMOL/L — SIGNIFICANT CHANGE UP (ref 3.5–5.3)
PROT SERPL-MCNC: 7.4 G/DL — SIGNIFICANT CHANGE UP (ref 6–8.3)
PROTHROM AB SERPL-ACNC: 11 SEC — SIGNIFICANT CHANGE UP (ref 10.6–13.6)
RBC # BLD: 3.95 M/UL — SIGNIFICANT CHANGE UP (ref 3.8–5.2)
RBC # FLD: 13.2 % — SIGNIFICANT CHANGE UP (ref 10.3–14.5)
SARS-COV-2 RNA SPEC QL NAA+PROBE: SIGNIFICANT CHANGE UP
SODIUM SERPL-SCNC: 142 MMOL/L — SIGNIFICANT CHANGE UP (ref 135–145)
TROPONIN I SERPL-MCNC: <0.015 NG/ML — SIGNIFICANT CHANGE UP (ref 0–0.04)
WBC # BLD: 6.14 K/UL — SIGNIFICANT CHANGE UP (ref 3.8–10.5)
WBC # FLD AUTO: 6.14 K/UL — SIGNIFICANT CHANGE UP (ref 3.8–10.5)

## 2021-08-08 PROCEDURE — 71045 X-RAY EXAM CHEST 1 VIEW: CPT | Mod: 26

## 2021-08-08 PROCEDURE — 85025 COMPLETE CBC W/AUTO DIFF WBC: CPT

## 2021-08-08 PROCEDURE — 80053 COMPREHEN METABOLIC PANEL: CPT

## 2021-08-08 PROCEDURE — 85610 PROTHROMBIN TIME: CPT

## 2021-08-08 PROCEDURE — 93005 ELECTROCARDIOGRAM TRACING: CPT

## 2021-08-08 PROCEDURE — 93970 EXTREMITY STUDY: CPT | Mod: 26

## 2021-08-08 PROCEDURE — 93010 ELECTROCARDIOGRAM REPORT: CPT

## 2021-08-08 PROCEDURE — 84484 ASSAY OF TROPONIN QUANT: CPT

## 2021-08-08 PROCEDURE — 36415 COLL VENOUS BLD VENIPUNCTURE: CPT

## 2021-08-08 PROCEDURE — 71045 X-RAY EXAM CHEST 1 VIEW: CPT

## 2021-08-08 PROCEDURE — 99284 EMERGENCY DEPT VISIT MOD MDM: CPT | Mod: 25

## 2021-08-08 PROCEDURE — 93970 EXTREMITY STUDY: CPT

## 2021-08-08 PROCEDURE — 85730 THROMBOPLASTIN TIME PARTIAL: CPT

## 2021-08-08 PROCEDURE — 99285 EMERGENCY DEPT VISIT HI MDM: CPT

## 2021-08-08 PROCEDURE — 87635 SARS-COV-2 COVID-19 AMP PRB: CPT

## 2021-08-08 RX ORDER — SODIUM CHLORIDE 9 MG/ML
3 INJECTION INTRAMUSCULAR; INTRAVENOUS; SUBCUTANEOUS ONCE
Refills: 0 | Status: COMPLETED | OUTPATIENT
Start: 2021-08-08 | End: 2021-08-08

## 2021-08-08 RX ADMIN — SODIUM CHLORIDE 3 MILLILITER(S): 9 INJECTION INTRAMUSCULAR; INTRAVENOUS; SUBCUTANEOUS at 01:25

## 2021-08-08 NOTE — ED ADULT TRIAGE NOTE - CHIEF COMPLAINT QUOTE
" Terrible pain in the left leg and the groin, it started yesterday evening on both groin but now it's only left .  I'm scared I may have blood clots "

## 2021-08-08 NOTE — ED PROVIDER NOTE - PATIENT PORTAL LINK FT
You can access the FollowMyHealth Patient Portal offered by [facility] by registering at the following website: [email or web address]. By joining [facility]’s FollowMyHealth portal, you will also be able to view your health information using other applications (apps) compatible with our system.

## 2021-08-08 NOTE — ED PROVIDER NOTE - NSFOLLOWUPINSTRUCTIONS_ED_ALL_ED_FT
You were seen in the emergency department for leg pain.     Please follow-up with your primary care doctor in the next 24-48 hours.     Please take Tylenol as prescribed on the bottle for pain control.     If you have any worsening symptoms, severe leg pain, swelling, chest pain or shortness of breath, please return to the emergency department.

## 2021-08-08 NOTE — ED PROVIDER NOTE - CLINICAL SUMMARY MEDICAL DECISION MAKING FREE TEXT BOX
Pt is neurovascularly intact, no current distress, state leg pain resolved.  Pt opting to wait for duplex of lower extremities. Endorsed to Dr. Ring.

## 2021-08-08 NOTE — ED PROVIDER NOTE - PHYSICAL EXAMINATION
BL LE:  no bony deformities, no leg length discrepancy, femoral and pedal pulses intact, cap refill  < 2 secs.

## 2021-08-08 NOTE — ED PROVIDER NOTE - PROGRESS NOTE DETAILS
patient signed out to me by Dr. Barfield pending duplex. US negative for DVT. patient updated on results and will discharge. Colten Ring

## 2021-08-08 NOTE — ED PROVIDER NOTE - OBJECTIVE STATEMENT
Chief complaint of intermittent B/l LE tenderness L>R since yesterday. On evaluation pt states symptom has resolved and refusing analgesia. No chest pain, no shortness of breath.   Meds Brilinta, Norvasc, Losartan, Lipitor.  PSX: left knee replacement 4 years ago

## 2021-08-12 DIAGNOSIS — Z01.818 ENCOUNTER FOR OTHER PREPROCEDURAL EXAMINATION: ICD-10-CM

## 2021-08-13 ENCOUNTER — APPOINTMENT (OUTPATIENT)
Dept: DISASTER EMERGENCY | Facility: CLINIC | Age: 81
End: 2021-08-13

## 2021-08-14 LAB — SARS-COV-2 N GENE NPH QL NAA+PROBE: NOT DETECTED

## 2021-08-16 ENCOUNTER — OUTPATIENT (OUTPATIENT)
Dept: OUTPATIENT SERVICES | Facility: HOSPITAL | Age: 81
LOS: 1 days | End: 2021-08-16
Payer: COMMERCIAL

## 2021-08-16 DIAGNOSIS — Z98.890 OTHER SPECIFIED POSTPROCEDURAL STATES: Chronic | ICD-10-CM

## 2021-08-16 DIAGNOSIS — Z90.11 ACQUIRED ABSENCE OF RIGHT BREAST AND NIPPLE: Chronic | ICD-10-CM

## 2021-08-16 DIAGNOSIS — Z90.710 ACQUIRED ABSENCE OF BOTH CERVIX AND UTERUS: Chronic | ICD-10-CM

## 2021-08-16 DIAGNOSIS — K43.0 INCISIONAL HERNIA WITH OBSTRUCTION, WITHOUT GANGRENE: Chronic | ICD-10-CM

## 2021-08-16 DIAGNOSIS — Z96.652 PRESENCE OF LEFT ARTIFICIAL KNEE JOINT: Chronic | ICD-10-CM

## 2021-08-16 DIAGNOSIS — H26.9 UNSPECIFIED CATARACT: Chronic | ICD-10-CM

## 2021-08-16 DIAGNOSIS — Z90.49 ACQUIRED ABSENCE OF OTHER SPECIFIED PARTS OF DIGESTIVE TRACT: Chronic | ICD-10-CM

## 2021-08-16 DIAGNOSIS — Z86.73 PERSONAL HISTORY OF TRANSIENT ISCHEMIC ATTACK (TIA), AND CEREBRAL INFARCTION WITHOUT RESIDUAL DEFICITS: ICD-10-CM

## 2021-08-16 DIAGNOSIS — S86.002S UNSPECIFIED INJURY OF LEFT ACHILLES TENDON, SEQUELA: Chronic | ICD-10-CM

## 2021-08-16 DIAGNOSIS — H33.21 SEROUS RETINAL DETACHMENT, RIGHT EYE: Chronic | ICD-10-CM

## 2021-08-16 PROCEDURE — 93312 ECHO TRANSESOPHAGEAL: CPT

## 2021-08-16 PROCEDURE — 93312 ECHO TRANSESOPHAGEAL: CPT | Mod: 26

## 2021-08-16 PROCEDURE — 93325 DOPPLER ECHO COLOR FLOW MAPG: CPT | Mod: 26

## 2021-08-16 PROCEDURE — 93325 DOPPLER ECHO COLOR FLOW MAPG: CPT

## 2021-08-16 PROCEDURE — 93320 DOPPLER ECHO COMPLETE: CPT

## 2021-08-16 PROCEDURE — 93320 DOPPLER ECHO COMPLETE: CPT | Mod: 26

## 2022-07-01 NOTE — ED PROVIDER NOTE - CHIEF COMPLAINT
Refer to the Assessment tab to view/cancel completed assessment. The patient is a 81y Female complaining of pain, lower leg.

## 2023-06-26 ENCOUNTER — NON-APPOINTMENT (OUTPATIENT)
Age: 83
End: 2023-06-26

## 2023-06-26 ENCOUNTER — APPOINTMENT (OUTPATIENT)
Dept: ELECTROPHYSIOLOGY | Facility: CLINIC | Age: 83
End: 2023-06-26
Payer: MEDICARE

## 2023-06-26 VITALS
SYSTOLIC BLOOD PRESSURE: 140 MMHG | WEIGHT: 180 LBS | HEIGHT: 62 IN | OXYGEN SATURATION: 95 % | DIASTOLIC BLOOD PRESSURE: 84 MMHG | BODY MASS INDEX: 33.13 KG/M2 | HEART RATE: 64 BPM

## 2023-06-26 PROCEDURE — 93000 ELECTROCARDIOGRAM COMPLETE: CPT

## 2023-06-26 PROCEDURE — 99205 OFFICE O/P NEW HI 60 MIN: CPT | Mod: 25

## 2023-06-26 RX ORDER — APIXABAN 5 MG/1
5 TABLET, FILM COATED ORAL
Qty: 30 | Refills: 1 | Status: ACTIVE | COMMUNITY
Start: 2023-06-26

## 2023-06-26 RX ORDER — METFORMIN HYDROCHLORIDE 625 MG/1
TABLET ORAL
Refills: 0 | Status: DISCONTINUED | COMMUNITY
End: 2023-06-26

## 2023-06-26 RX ORDER — SIMVASTATIN 80 MG/1
TABLET, FILM COATED ORAL
Refills: 0 | Status: DISCONTINUED | COMMUNITY
End: 2023-06-26

## 2023-06-26 RX ORDER — LOSARTAN POTASSIUM AND HYDROCHLOROTHIAZIDE 12.5; 5 MG/1; MG/1
50-12.5 TABLET ORAL DAILY
Refills: 0 | Status: ACTIVE | COMMUNITY
Start: 2023-06-26

## 2023-06-26 RX ORDER — LOSARTAN POTASSIUM 100 MG/1
TABLET, FILM COATED ORAL
Refills: 0 | Status: DISCONTINUED | COMMUNITY
End: 2023-06-26

## 2023-06-26 RX ORDER — AMLODIPINE BESYLATE 5 MG/1
TABLET ORAL
Refills: 0 | Status: DISCONTINUED | COMMUNITY
End: 2023-06-26

## 2023-06-26 NOTE — REASON FOR VISIT
[Arrhythmia/ECG Abnorrmalities] : arrhythmia/ECG abnormalities [FreeTextEntry3] : Suzette Winters MD

## 2023-06-26 NOTE — DISCUSSION/SUMMARY
[EKG obtained to assist in diagnosis and management of assessed problem(s)] : EKG obtained to assist in diagnosis and management of assessed problem(s) [FreeTextEntry1] : Impression:\par \par 1. Paroxysmal afib: EKG performed today to assess for presence of conduction disease and reveals NSR. Last ECHO with normal LVEF. Discussed treatment options for afib including rate control vs antiarrhythmics vs possible ablation. Given recurrent symptomatic afib despite rate control management and preference to avoid antiarrhythmics, recommend undergoing possible afib ablation. Risks, benefits, and alternatives to procedure discussed at length. Risks including that of bleeding, infection, stroke, and cardiac tamponade discussed and she verbalizes understanding of all. Will hold all cardiac medications the morning of the ablatio.\par \par 2. HTN: resume oral antihypertensives as prescribed. Encouraged heart healthy diet, sodium restriction, and weight loss. Continue regular f/u with Cardiologist for further HTN management.\par \par Plan for afib ablation and RTO for f/u post procedure.

## 2023-06-26 NOTE — HISTORY OF PRESENT ILLNESS
[FreeTextEntry1] : Reynaldo Richter is an 82y/o woman with Hx of HTN, HLD, CVA, and paroxysmal afib, on Eliquis, who presents today for initial evaluation. Admits Hx of recurrent CVA/TIA s/p ILR placement by [facility] ([facility]). Has been having recurrent palpitations, more often at night but also episodes during the day. Was started on Eliquis. Can feel palpitations almost every day, sometimes waking her from sleep. Has considered going to the ER before but never has and usually self resolves. Denies chest pain, SOB, syncope or near syncope.\par

## 2023-06-26 NOTE — CARDIOLOGY SUMMARY
[de-identified] : 8/16/2021: CONCLUSIONS:\par 1. Normal mitral valve. Trace mitral regurgitation.\par 2. Normal trileaflet aortic valve. No aortic stenosis.\par Trace aortic regurgitation. \par 3. Normal aortic root. No atheroma is seen in the\par descending aorta and aortic arch.\par 4. No left atrium or left atrial appendage thrombus. \par 5. Normal Left Ventricular Systolic Function,  (EF = 55 to\par 60%)\par 6. No clot seen in right atrium.\par 7. Normal right ventricular size and function.\par 8. RV systolic pressure is mildly increased at  41 mm Hg.\par 9. Normal tricuspid valve. Moderate tricuspid\par regurgitation. \par 10. Normal pulmonic valve. Trace pulmonic insufficiency is\par noted.\par 11. Atrial septum appears intact on 2D echo and color\par Doppler. Agitated saline injection was negative for\par intracardiac shunt.\par 12. No pericardial effusion.\par 13. No cardiac source of embolus is identified.\par \par *** No previous Echo exam.

## 2023-08-02 ENCOUNTER — OUTPATIENT (OUTPATIENT)
Dept: OUTPATIENT SERVICES | Facility: HOSPITAL | Age: 83
LOS: 1 days | End: 2023-08-02

## 2023-08-02 VITALS
TEMPERATURE: 97 F | OXYGEN SATURATION: 98 % | DIASTOLIC BLOOD PRESSURE: 92 MMHG | HEART RATE: 58 BPM | WEIGHT: 182.98 LBS | SYSTOLIC BLOOD PRESSURE: 158 MMHG | RESPIRATION RATE: 16 BRPM | HEIGHT: 62 IN

## 2023-08-02 DIAGNOSIS — Z90.49 ACQUIRED ABSENCE OF OTHER SPECIFIED PARTS OF DIGESTIVE TRACT: Chronic | ICD-10-CM

## 2023-08-02 DIAGNOSIS — K43.0 INCISIONAL HERNIA WITH OBSTRUCTION, WITHOUT GANGRENE: Chronic | ICD-10-CM

## 2023-08-02 DIAGNOSIS — Z90.11 ACQUIRED ABSENCE OF RIGHT BREAST AND NIPPLE: Chronic | ICD-10-CM

## 2023-08-02 DIAGNOSIS — I48.0 PAROXYSMAL ATRIAL FIBRILLATION: ICD-10-CM

## 2023-08-02 DIAGNOSIS — I48.91 UNSPECIFIED ATRIAL FIBRILLATION: ICD-10-CM

## 2023-08-02 DIAGNOSIS — Z96.652 PRESENCE OF LEFT ARTIFICIAL KNEE JOINT: Chronic | ICD-10-CM

## 2023-08-02 DIAGNOSIS — Z90.710 ACQUIRED ABSENCE OF BOTH CERVIX AND UTERUS: Chronic | ICD-10-CM

## 2023-08-02 DIAGNOSIS — H26.9 UNSPECIFIED CATARACT: Chronic | ICD-10-CM

## 2023-08-02 DIAGNOSIS — H33.21 SEROUS RETINAL DETACHMENT, RIGHT EYE: Chronic | ICD-10-CM

## 2023-08-02 DIAGNOSIS — S86.002S UNSPECIFIED INJURY OF LEFT ACHILLES TENDON, SEQUELA: Chronic | ICD-10-CM

## 2023-08-02 DIAGNOSIS — Z98.890 OTHER SPECIFIED POSTPROCEDURAL STATES: Chronic | ICD-10-CM

## 2023-08-02 LAB
ALBUMIN SERPL ELPH-MCNC: 3.9 G/DL — SIGNIFICANT CHANGE UP (ref 3.3–5)
ALP SERPL-CCNC: 83 U/L — SIGNIFICANT CHANGE UP (ref 40–120)
ALT FLD-CCNC: 12 U/L — SIGNIFICANT CHANGE UP (ref 4–33)
ANION GAP SERPL CALC-SCNC: 10 MMOL/L — SIGNIFICANT CHANGE UP (ref 7–14)
AST SERPL-CCNC: 18 U/L — SIGNIFICANT CHANGE UP (ref 4–32)
BILIRUB SERPL-MCNC: 0.8 MG/DL — SIGNIFICANT CHANGE UP (ref 0.2–1.2)
BLD GP AB SCN SERPL QL: NEGATIVE — SIGNIFICANT CHANGE UP
BUN SERPL-MCNC: 13 MG/DL — SIGNIFICANT CHANGE UP (ref 7–23)
CALCIUM SERPL-MCNC: 9.3 MG/DL — SIGNIFICANT CHANGE UP (ref 8.4–10.5)
CHLORIDE SERPL-SCNC: 100 MMOL/L — SIGNIFICANT CHANGE UP (ref 98–107)
CO2 SERPL-SCNC: 30 MMOL/L — SIGNIFICANT CHANGE UP (ref 22–31)
CREAT SERPL-MCNC: 0.93 MG/DL — SIGNIFICANT CHANGE UP (ref 0.5–1.3)
EGFR: 61 ML/MIN/1.73M2 — SIGNIFICANT CHANGE UP
GLUCOSE SERPL-MCNC: 81 MG/DL — SIGNIFICANT CHANGE UP (ref 70–99)
HCT VFR BLD CALC: 39.4 % — SIGNIFICANT CHANGE UP (ref 34.5–45)
HGB BLD-MCNC: 12.6 G/DL — SIGNIFICANT CHANGE UP (ref 11.5–15.5)
MCHC RBC-ENTMCNC: 28 PG — SIGNIFICANT CHANGE UP (ref 27–34)
MCHC RBC-ENTMCNC: 32 GM/DL — SIGNIFICANT CHANGE UP (ref 32–36)
MCV RBC AUTO: 87.6 FL — SIGNIFICANT CHANGE UP (ref 80–100)
NRBC # BLD: 0 /100 WBCS — SIGNIFICANT CHANGE UP (ref 0–0)
NRBC # FLD: 0 K/UL — SIGNIFICANT CHANGE UP (ref 0–0)
PLATELET # BLD AUTO: 235 K/UL — SIGNIFICANT CHANGE UP (ref 150–400)
POTASSIUM SERPL-MCNC: 4.1 MMOL/L — SIGNIFICANT CHANGE UP (ref 3.5–5.3)
POTASSIUM SERPL-SCNC: 4.1 MMOL/L — SIGNIFICANT CHANGE UP (ref 3.5–5.3)
PROT SERPL-MCNC: 7.8 G/DL — SIGNIFICANT CHANGE UP (ref 6–8.3)
RBC # BLD: 4.5 M/UL — SIGNIFICANT CHANGE UP (ref 3.8–5.2)
RBC # FLD: 13.1 % — SIGNIFICANT CHANGE UP (ref 10.3–14.5)
RH IG SCN BLD-IMP: POSITIVE — SIGNIFICANT CHANGE UP
SODIUM SERPL-SCNC: 140 MMOL/L — SIGNIFICANT CHANGE UP (ref 135–145)
WBC # BLD: 4.5 K/UL — SIGNIFICANT CHANGE UP (ref 3.8–10.5)
WBC # FLD AUTO: 4.5 K/UL — SIGNIFICANT CHANGE UP (ref 3.8–10.5)

## 2023-08-02 RX ORDER — TETRAHYDROZOLINE/POLYETHYL GLY 0.05 %-1 %
1 DROPS OPHTHALMIC (EYE)
Qty: 0 | Refills: 0 | DISCHARGE

## 2023-08-02 RX ORDER — ESOMEPRAZOLE MAGNESIUM 40 MG/1
1 CAPSULE, DELAYED RELEASE ORAL
Qty: 0 | Refills: 0 | DISCHARGE

## 2023-08-02 RX ORDER — DICLOFENAC SODIUM 30 MG/G
1 GEL TOPICAL
Qty: 0 | Refills: 0 | DISCHARGE

## 2023-08-02 RX ORDER — ACETAMINOPHEN 500 MG
2 TABLET ORAL
Qty: 0 | Refills: 0 | DISCHARGE

## 2023-08-02 RX ORDER — LOSARTAN POTASSIUM 100 MG/1
1 TABLET, FILM COATED ORAL
Qty: 0 | Refills: 0 | DISCHARGE

## 2023-08-02 RX ORDER — AMLODIPINE BESYLATE 2.5 MG/1
1 TABLET ORAL
Qty: 0 | Refills: 0 | DISCHARGE

## 2023-08-02 RX ORDER — ROSUVASTATIN CALCIUM 5 MG/1
1 TABLET ORAL
Qty: 0 | Refills: 0 | DISCHARGE

## 2023-08-02 RX ORDER — CYCLOBENZAPRINE HYDROCHLORIDE 10 MG/1
1 TABLET, FILM COATED ORAL
Qty: 0 | Refills: 0 | DISCHARGE

## 2023-08-02 RX ORDER — CLOPIDOGREL BISULFATE 75 MG/1
1 TABLET, FILM COATED ORAL
Qty: 0 | Refills: 0 | DISCHARGE

## 2023-08-02 NOTE — H&P PST ADULT - PATIENT ON (OXYGEN DELIVERY METHOD)
Infant ID bands and Hugs Tag [id number]   confirmed with L&D nurse. Footprints and photo taken. Mother requests bath and Hepatitis B vaccine at this time. room air

## 2023-08-02 NOTE — H&P PST ADULT - NSICDXPASTMEDICALHX_GEN_ALL_CORE_FT
PAST MEDICAL HISTORY:  Diabetes mellitus, type 2     Essential (primary) hypertension     GERD (gastroesophageal reflux disease)     Hyperlipidemia, unspecified hyperlipidemia type     Obesity (BMI 30-39.9)     Primary osteoarthritis of left knee     Transient ischemic attack (TIA) 2004

## 2023-08-02 NOTE — H&P PST ADULT - HISTORY OF PRESENT ILLNESS
79 yr old female with PMH of TIA, Diabetes, GERD, HTN, HLD, OA presents with c/o intermittent abdominal pain due to left side abdominal hernia. Pt reports worsening of pain with activity and after ingestion of meals. Pt for repair of left side recurrent abdominal wall hernia on 12/4/2019. Pt is a 83 yr old female scheduled for A-fib Ablation with [facility] with Dr Allen tentatively 8/15/23 - pt hx HTN, HLD, CVA and PAF - c/o of chronic palpitations with increased fatigue - recent TIAs - started on Eliquis - pt hx ILR placement 2021 - now for ablation

## 2023-08-02 NOTE — H&P PST ADULT - PROBLEM SELECTOR PLAN 1
Pt scheduled for surgery and preop instructions including instructions for taking Famotidine  on the day of surgery, given verbally and with use of  written materials, and patient confirming understanding of such instructions using  teach back method.  Pt has EP instructions - reviewed with her - pt to follow med instructions preop as per EP -   Pt to take Eliquis, Gabapentin, Metoprolol and Losartan/HCTZ as per EP instructions

## 2023-08-02 NOTE — H&P PST ADULT - NEGATIVE OPHTHALMOLOGIC SYMPTOMS
no lacrimation L/no lacrimation R/no blurred vision L/no discharge R/no pain L/no irritation L/no irritation R/no loss of vision L/no scleral injection L/no scleral injection R no blurred vision L/no scleral injection L

## 2023-08-02 NOTE — H&P PST ADULT - CARDIOVASCULAR COMMENTS
Hx TIA r/t a-fib - increased recently - pt c/o numbness in fingers and cheeks Hx CVA/TIA with placement ILR 2021 Pt now c/o chronic palpitations r/t a-fib - increased recently - pt c/o numbness in fingers and cheeks r/t TIAs

## 2023-08-02 NOTE — H&P PST ADULT - MUSCULOSKELETAL COMMENTS
Pt c/o lower back and cervical neck pain - especially rotation to right - pt c/o of numbness to fingers that comes and goes attributed to TIAs -pain radiates to right leg at times - weakness with walking - uses cane occasionally

## 2023-08-02 NOTE — H&P PST ADULT - NEGATIVE LYMPHATIC SYMPTOMS
Information: Selecting Yes will display possible errors in your note based on the variables you have selected. This validation is only offered as a suggestion for you. PLEASE NOTE THAT THE VALIDATION TEXT WILL BE REMOVED WHEN YOU FINALIZE YOUR NOTE. IF YOU WANT TO FAX A PRELIMINARY NOTE YOU WILL NEED TO TOGGLE THIS TO 'NO' IF YOU DO NOT WANT IT IN YOUR FAXED NOTE. no enlarged lymph nodes/no tender lymph nodes/no swelling of extremity

## 2023-08-03 LAB
A1C WITH ESTIMATED AVERAGE GLUCOSE RESULT: 6.1 % — HIGH (ref 4–5.6)
ESTIMATED AVERAGE GLUCOSE: 128 — SIGNIFICANT CHANGE UP

## 2023-08-15 ENCOUNTER — OUTPATIENT (OUTPATIENT)
Dept: OUTPATIENT SERVICES | Facility: HOSPITAL | Age: 83
LOS: 1 days | Discharge: HOME | End: 2023-08-15
Payer: MEDICARE

## 2023-08-15 DIAGNOSIS — Z90.11 ACQUIRED ABSENCE OF RIGHT BREAST AND NIPPLE: Chronic | ICD-10-CM

## 2023-08-15 DIAGNOSIS — K43.0 INCISIONAL HERNIA WITH OBSTRUCTION, WITHOUT GANGRENE: Chronic | ICD-10-CM

## 2023-08-15 DIAGNOSIS — Z98.890 OTHER SPECIFIED POSTPROCEDURAL STATES: Chronic | ICD-10-CM

## 2023-08-15 DIAGNOSIS — Z90.49 ACQUIRED ABSENCE OF OTHER SPECIFIED PARTS OF DIGESTIVE TRACT: Chronic | ICD-10-CM

## 2023-08-15 DIAGNOSIS — H33.21 SEROUS RETINAL DETACHMENT, RIGHT EYE: Chronic | ICD-10-CM

## 2023-08-15 DIAGNOSIS — Z90.710 ACQUIRED ABSENCE OF BOTH CERVIX AND UTERUS: Chronic | ICD-10-CM

## 2023-08-15 DIAGNOSIS — H26.9 UNSPECIFIED CATARACT: Chronic | ICD-10-CM

## 2023-08-15 DIAGNOSIS — Z96.652 PRESENCE OF LEFT ARTIFICIAL KNEE JOINT: Chronic | ICD-10-CM

## 2023-08-15 DIAGNOSIS — S86.002S UNSPECIFIED INJURY OF LEFT ACHILLES TENDON, SEQUELA: Chronic | ICD-10-CM

## 2023-08-15 DIAGNOSIS — I48.0 PAROXYSMAL ATRIAL FIBRILLATION: ICD-10-CM

## 2023-08-15 LAB
GLUCOSE BLDC GLUCOMTR-MCNC: 107 MG/DL — HIGH (ref 70–99)
GLUCOSE BLDC GLUCOMTR-MCNC: 97 MG/DL — SIGNIFICANT CHANGE UP (ref 70–99)
RH IG SCN BLD-IMP: POSITIVE — SIGNIFICANT CHANGE UP

## 2023-08-15 PROCEDURE — 93656 COMPRE EP EVAL ABLTJ ATR FIB: CPT | Mod: 59

## 2023-08-15 PROCEDURE — 93623 PRGRMD STIMJ&PACG IV RX NFS: CPT | Mod: 26

## 2023-08-15 PROCEDURE — 93010 ELECTROCARDIOGRAM REPORT: CPT | Mod: 77,59

## 2023-08-15 PROCEDURE — 93010 ELECTROCARDIOGRAM REPORT: CPT

## 2023-08-15 PROCEDURE — 93655 ICAR CATH ABLTJ DSCRT ARRHYT: CPT | Mod: 59

## 2023-08-15 RX ORDER — ATORVASTATIN CALCIUM 80 MG/1
1 TABLET, FILM COATED ORAL
Refills: 0 | DISCHARGE

## 2023-08-15 RX ORDER — APIXABAN 2.5 MG/1
1 TABLET, FILM COATED ORAL
Refills: 0 | DISCHARGE

## 2023-08-15 RX ORDER — GABAPENTIN 400 MG/1
1 CAPSULE ORAL
Refills: 0 | DISCHARGE

## 2023-08-15 RX ORDER — METOPROLOL TARTRATE 50 MG
1 TABLET ORAL
Refills: 0 | DISCHARGE

## 2023-08-15 RX ORDER — PANTOPRAZOLE SODIUM 20 MG/1
1 TABLET, DELAYED RELEASE ORAL
Qty: 30 | Refills: 0
Start: 2023-08-15 | End: 2023-09-13

## 2023-08-15 RX ORDER — SODIUM CHLORIDE 9 MG/ML
3 INJECTION INTRAMUSCULAR; INTRAVENOUS; SUBCUTANEOUS EVERY 8 HOURS
Refills: 0 | Status: DISCONTINUED | OUTPATIENT
Start: 2023-08-15 | End: 2023-08-29

## 2023-08-15 RX ORDER — LOSARTAN/HYDROCHLOROTHIAZIDE 100MG-25MG
1 TABLET ORAL
Refills: 0 | DISCHARGE

## 2023-08-15 RX ADMIN — SODIUM CHLORIDE 3 MILLILITER(S): 9 INJECTION INTRAMUSCULAR; INTRAVENOUS; SUBCUTANEOUS at 15:02

## 2023-08-15 NOTE — CHART NOTE - NSCHARTNOTEFT_GEN_A_CORE
The patient presents today for atrial fibrillation ablation   See H&P from presurgical testing.   The patient denies any new complaints since the last time she was seen by Dr. Allen.  Medication list obtained from patient and added to chart    NPO Date and Time:  8/14/23 a t  Mallampati:2  Anticoagulation Date and Time? Eliquis on 8/14/23 at   Previous Endoscopy?  NO  Hx of CVA?  YES  Sleep Apnea?  NO  Dentures? NO  Loose Teeth? NO      Patient Denies:    Prior difficult intubation or airway problems  Odynophagia  Dysphagia  Esophageal stricture  Esophageal tumor  Esophageal varices  Esophageal perforation/laceration  Esophageal diverticulum  Large diaphragmatic Hernia  Active or recent upper gastrointestinal (GI) bleed  History of GI surgery  History of Esophageal surgery  History of South's esophagus  Tenuous cardiorespiratory status  Cervical spine arthritis with reduced range of motion or atlantoaxial joint disease. History of radiation to head, neck, or mediastinum  Severe thrombocytopenia  Obstructive sleep apnea The patient presents today for atrial fibrillation ablation   See H&P from presurgical testing.   The patient denies any new complaints since the last time she was seen by Dr. Allen.  Medication list obtained from patient and added to chart    NPO Date and Time:  8/14/23 at 1900  Mallampati: 2  Anticoagulation Date and Time? Eliquis on 8/14/23 at 1800  Previous Endoscopy?  NO  Hx of CVA?  YES, no deficits per patient   Sleep Apnea?  NO  Dentures? NO  Loose Teeth? NO      Patient Denies:    Prior difficult intubation or airway problems  Odynophagia  Dysphagia  Esophageal stricture  Esophageal tumor  Esophageal varices  Esophageal perforation/laceration  Esophageal diverticulum  Large diaphragmatic Hernia  Active or recent upper gastrointestinal (GI) bleed  History of GI surgery  History of Esophageal surgery  History of South's esophagus  Tenuous cardiorespiratory status  Cervical spine arthritis with reduced range of motion or atlantoaxial joint disease. History of radiation to head, neck, or mediastinum  Severe thrombocytopenia  Obstructive sleep apnea

## 2023-09-18 ENCOUNTER — NON-APPOINTMENT (OUTPATIENT)
Age: 83
End: 2023-09-18

## 2023-09-18 ENCOUNTER — APPOINTMENT (OUTPATIENT)
Dept: ELECTROPHYSIOLOGY | Facility: CLINIC | Age: 83
End: 2023-09-18
Payer: MEDICARE

## 2023-09-18 VITALS
RESPIRATION RATE: 17 BRPM | SYSTOLIC BLOOD PRESSURE: 148 MMHG | HEIGHT: 62 IN | HEART RATE: 64 BPM | OXYGEN SATURATION: 96 % | WEIGHT: 185 LBS | BODY MASS INDEX: 34.04 KG/M2 | DIASTOLIC BLOOD PRESSURE: 81 MMHG

## 2023-09-18 DIAGNOSIS — K21.9 GASTRO-ESOPHAGEAL REFLUX DISEASE W/OUT ESOPHAGITIS: ICD-10-CM

## 2023-09-18 PROCEDURE — 99213 OFFICE O/P EST LOW 20 MIN: CPT | Mod: 25

## 2023-09-18 PROCEDURE — 93000 ELECTROCARDIOGRAM COMPLETE: CPT

## 2023-09-18 RX ORDER — PANTOPRAZOLE 40 MG/1
40 TABLET, DELAYED RELEASE ORAL
Qty: 90 | Refills: 3 | Status: ACTIVE | COMMUNITY
Start: 2023-09-18 | End: 1900-01-01

## 2023-11-06 ENCOUNTER — APPOINTMENT (OUTPATIENT)
Dept: ELECTROPHYSIOLOGY | Facility: CLINIC | Age: 83
End: 2023-11-06
Payer: MEDICARE

## 2023-11-06 ENCOUNTER — NON-APPOINTMENT (OUTPATIENT)
Age: 83
End: 2023-11-06

## 2023-11-06 VITALS
OXYGEN SATURATION: 99 % | WEIGHT: 185 LBS | BODY MASS INDEX: 34.04 KG/M2 | HEART RATE: 67 BPM | SYSTOLIC BLOOD PRESSURE: 156 MMHG | DIASTOLIC BLOOD PRESSURE: 84 MMHG | HEIGHT: 62 IN

## 2023-11-06 VITALS — DIASTOLIC BLOOD PRESSURE: 67 MMHG | TEMPERATURE: 97.7 F | SYSTOLIC BLOOD PRESSURE: 158 MMHG

## 2023-11-06 DIAGNOSIS — R00.2 PALPITATIONS: ICD-10-CM

## 2023-11-06 DIAGNOSIS — Z98.890 OTHER SPECIFIED POSTPROCEDURAL STATES: ICD-10-CM

## 2023-11-06 DIAGNOSIS — Z86.79 OTHER SPECIFIED POSTPROCEDURAL STATES: ICD-10-CM

## 2023-11-06 DIAGNOSIS — I10 ESSENTIAL (PRIMARY) HYPERTENSION: ICD-10-CM

## 2023-11-06 PROCEDURE — 93000 ELECTROCARDIOGRAM COMPLETE: CPT

## 2023-11-06 PROCEDURE — 99214 OFFICE O/P EST MOD 30 MIN: CPT | Mod: 25

## 2023-11-06 RX ORDER — METOPROLOL SUCCINATE 50 MG/1
50 TABLET, EXTENDED RELEASE ORAL
Refills: 2 | Status: ACTIVE | COMMUNITY
Start: 2023-06-26

## 2023-12-01 ENCOUNTER — NON-APPOINTMENT (OUTPATIENT)
Age: 83
End: 2023-12-01

## 2023-12-01 DIAGNOSIS — I48.0 PAROXYSMAL ATRIAL FIBRILLATION: ICD-10-CM

## 2025-01-27 ENCOUNTER — NON-APPOINTMENT (OUTPATIENT)
Age: 85
End: 2025-01-27

## 2025-01-27 ENCOUNTER — APPOINTMENT (OUTPATIENT)
Dept: ELECTROPHYSIOLOGY | Facility: CLINIC | Age: 85
End: 2025-01-27
Payer: MEDICARE

## 2025-01-27 VITALS
WEIGHT: 180 LBS | TEMPERATURE: 97.7 F | OXYGEN SATURATION: 96 % | DIASTOLIC BLOOD PRESSURE: 85 MMHG | BODY MASS INDEX: 33.13 KG/M2 | HEART RATE: 52 BPM | SYSTOLIC BLOOD PRESSURE: 141 MMHG | HEIGHT: 62 IN

## 2025-01-27 DIAGNOSIS — I10 ESSENTIAL (PRIMARY) HYPERTENSION: ICD-10-CM

## 2025-01-27 DIAGNOSIS — I63.9 CEREBRAL INFARCTION, UNSPECIFIED: ICD-10-CM

## 2025-01-27 DIAGNOSIS — Z95.818 PRESENCE OF OTHER CARDIAC IMPLANTS AND GRAFTS: ICD-10-CM

## 2025-01-27 DIAGNOSIS — I48.0 PAROXYSMAL ATRIAL FIBRILLATION: ICD-10-CM

## 2025-01-27 DIAGNOSIS — R00.2 PALPITATIONS: ICD-10-CM

## 2025-01-27 PROCEDURE — 93000 ELECTROCARDIOGRAM COMPLETE: CPT

## 2025-01-27 PROCEDURE — 99215 OFFICE O/P EST HI 40 MIN: CPT | Mod: 25

## 2025-02-07 ENCOUNTER — TRANSCRIPTION ENCOUNTER (OUTPATIENT)
Age: 85
End: 2025-02-07

## 2025-02-07 ENCOUNTER — OUTPATIENT (OUTPATIENT)
Dept: OUTPATIENT SERVICES | Facility: HOSPITAL | Age: 85
LOS: 1 days | Discharge: HOME | End: 2025-02-07

## 2025-02-07 VITALS
SYSTOLIC BLOOD PRESSURE: 172 MMHG | HEART RATE: 55 BPM | RESPIRATION RATE: 14 BRPM | DIASTOLIC BLOOD PRESSURE: 84 MMHG | OXYGEN SATURATION: 97 % | TEMPERATURE: 98 F

## 2025-02-07 VITALS
WEIGHT: 179.9 LBS | RESPIRATION RATE: 12 BRPM | HEART RATE: 55 BPM | SYSTOLIC BLOOD PRESSURE: 189 MMHG | TEMPERATURE: 98 F | OXYGEN SATURATION: 96 % | DIASTOLIC BLOOD PRESSURE: 92 MMHG | HEIGHT: 62 IN

## 2025-02-07 DIAGNOSIS — Z95.818 PRESENCE OF OTHER CARDIAC IMPLANTS AND GRAFTS: ICD-10-CM

## 2025-02-07 DIAGNOSIS — Z90.710 ACQUIRED ABSENCE OF BOTH CERVIX AND UTERUS: Chronic | ICD-10-CM

## 2025-02-07 DIAGNOSIS — Z96.652 PRESENCE OF LEFT ARTIFICIAL KNEE JOINT: Chronic | ICD-10-CM

## 2025-02-07 DIAGNOSIS — H59.819 CHORIORETINAL SCARS AFTER SURGERY FOR DETACHMENT, UNSPECIFIED EYE: Chronic | ICD-10-CM

## 2025-02-07 DIAGNOSIS — H33.21 SEROUS RETINAL DETACHMENT, RIGHT EYE: Chronic | ICD-10-CM

## 2025-02-07 DIAGNOSIS — Z98.890 OTHER SPECIFIED POSTPROCEDURAL STATES: Chronic | ICD-10-CM

## 2025-02-07 DIAGNOSIS — Z90.49 ACQUIRED ABSENCE OF OTHER SPECIFIED PARTS OF DIGESTIVE TRACT: Chronic | ICD-10-CM

## 2025-02-07 DIAGNOSIS — Z90.89 ACQUIRED ABSENCE OF OTHER ORGANS: Chronic | ICD-10-CM

## 2025-02-07 DIAGNOSIS — K43.0 INCISIONAL HERNIA WITH OBSTRUCTION, WITHOUT GANGRENE: Chronic | ICD-10-CM

## 2025-02-07 DIAGNOSIS — Z98.41 CATARACT EXTRACTION STATUS, RIGHT EYE: Chronic | ICD-10-CM

## 2025-02-07 DIAGNOSIS — H26.9 UNSPECIFIED CATARACT: Chronic | ICD-10-CM

## 2025-02-07 DIAGNOSIS — S86.002S UNSPECIFIED INJURY OF LEFT ACHILLES TENDON, SEQUELA: Chronic | ICD-10-CM

## 2025-02-07 DIAGNOSIS — Z90.11 ACQUIRED ABSENCE OF RIGHT BREAST AND NIPPLE: Chronic | ICD-10-CM

## 2025-02-07 PROBLEM — E11.9 TYPE 2 DIABETES MELLITUS WITHOUT COMPLICATIONS: Chronic | Status: INACTIVE | Noted: 2017-05-03 | Resolved: 2025-02-07

## 2025-02-07 PROBLEM — M17.12 UNILATERAL PRIMARY OSTEOARTHRITIS, LEFT KNEE: Chronic | Status: INACTIVE | Noted: 2017-05-03 | Resolved: 2025-02-07

## 2025-02-07 PROBLEM — I10 ESSENTIAL (PRIMARY) HYPERTENSION: Chronic | Status: INACTIVE | Noted: 2017-05-03 | Resolved: 2025-02-07

## 2025-02-07 PROBLEM — E66.9 OBESITY, UNSPECIFIED: Chronic | Status: INACTIVE | Noted: 2017-05-03 | Resolved: 2025-02-07

## 2025-02-07 PROBLEM — E78.5 HYPERLIPIDEMIA, UNSPECIFIED: Chronic | Status: INACTIVE | Noted: 2017-05-03 | Resolved: 2025-02-07

## 2025-02-07 PROCEDURE — 33285 INSJ SUBQ CAR RHYTHM MNTR: CPT

## 2025-02-07 PROCEDURE — 33286 RMVL SUBQ CAR RHYTHM MNTR: CPT | Mod: 59

## 2025-02-07 RX ORDER — METOPROLOL SUCCINATE 25 MG
1 TABLET, EXTENDED RELEASE 24 HR ORAL
Refills: 0 | DISCHARGE

## 2025-02-07 RX ORDER — ATORVASTATIN CALCIUM 80 MG/1
1 TABLET, FILM COATED ORAL
Refills: 0 | DISCHARGE

## 2025-02-07 RX ORDER — LOSARTAN POTASSIUM AND HYDROCHLOROTHIAZIDE 50; 12.5 MG/1; MG/1
1 TABLET, FILM COATED ORAL
Refills: 0 | DISCHARGE

## 2025-02-07 RX ORDER — APIXABAN 5 MG/1
1 TABLET, FILM COATED ORAL
Qty: 0 | Refills: 0 | DISCHARGE

## 2025-02-07 RX ORDER — PANTOPRAZOLE 20 MG/1
1 TABLET, DELAYED RELEASE ORAL
Refills: 0 | DISCHARGE

## 2025-02-07 NOTE — CHART NOTE - NSCHARTNOTEFT_GEN_A_CORE
Pt s/p ILR explant and re-implant. As per Dr. Allen, Eliquis may resume tomorrow morning, 2/8. Plan for DC home.

## 2025-02-07 NOTE — H&P CARDIOLOGY - HISTORY OF PRESENT ILLNESS
83 y/o female with a PMHx of paroxysmal atrial fibrillation s/p PVI and AT anterior mitral valve ablation with ILR placement on Eliquis (last dose 2/6/2025 PM), CVA (no residual deficits), HTN, HLD and GERD presents for elective loop recorder explant and re-implant. Pt does have episodes of intermittent unprovoked palpitations, which start and resolve without any provoking or palliating factors. Pt had a recent Holter placed by her cardiologist which showed paroxysmal SVT (up to 13 seconds), but no sustained tachyarrhythmias. ILR check recently showed that device was EOS. Pt now reports for explant and re-implant. Pt denies fever, chills, recent travel, headache, dizziness, visual deficits, chest pain, shortness of breath, orthopnea, abdominal pain, N/V/D/C, hematochezia, melena, dysuria, hematuria, LOC, syncope, peripheral edema.    EP: Dr. Tamra Allen  Cardiologist: Dr. Adams retired

## 2025-02-07 NOTE — H&P CARDIOLOGY - NSICDXPASTMEDICALHX_GEN_ALL_CORE_FT
PAST MEDICAL HISTORY:  CVA (cerebrovascular accident)     GERD (gastroesophageal reflux disease)     HLD (hyperlipidemia)     HTN (hypertension)     PAF (paroxysmal atrial fibrillation)

## 2025-02-07 NOTE — ASU DISCHARGE PLAN (ADULT/PEDIATRIC) - ASU DC SPECIAL INSTRUCTIONSFT
pt is here for ILR explant and reimplant    Plan:   Post-op ILR explant and reimplant instruction has been verbally explained and given to the patient. Patient was given ID card, Booklet and home monitor. Patient expressed understanding and all questions were answered. A carbon copy is located in the patient chart  Patient is schedule for an Tele appointment from EP clinic  You can return to normal activites 24hours after the procedure   No scrubbing the incision site for 2 weeks  No lotion, ointment, powder or direct sunlight to the incision site for 2 weeks   No swimming pool, Jacuzzi, or bath for 5 days.   Patient can shower 24 hours after procedure. Pat the area dry  Pt was instructed to call [phone] if the following occurs:      - fever with temperature > 100.6      - swelling, drainage or bleeding at the site incision       - severe chest pain, SOB, n/v

## 2025-02-07 NOTE — H&P CARDIOLOGY - NSICDXPASTSURGICALHX_GEN_ALL_CORE_FT
PAST SURGICAL HISTORY:  Chorioretinal scar S/P retinal detachment repair     S/P appendectomy     S/P bilateral cataract extraction     S/P laparoscopic cholecystectomy 2010    S/P lumpectomy, right breast     S/P NAVJOT-BSO (total abdominal hysterectomy and bilateral salpingo-oophorectomy) 1993 (due to fibroids)    S/P TKR (total knee replacement), left     S/P tonsillectomy and adenoidectomy

## 2025-02-07 NOTE — ASU DISCHARGE PLAN (ADULT/PEDIATRIC) - CARE PROVIDER_API CALL
Tamra Allen  Cardiac Electrophysiology  [address] [place] [address], [address]  [place] [address]  Phone: [phone]  Fax: [phone]  Follow Up Time:

## 2025-02-10 PROBLEM — I10 ESSENTIAL (PRIMARY) HYPERTENSION: Chronic | Status: ACTIVE | Noted: 2025-02-07

## 2025-02-10 PROBLEM — I63.9 CEREBRAL INFARCTION, UNSPECIFIED: Chronic | Status: ACTIVE | Noted: 2025-02-07

## 2025-02-10 PROBLEM — E78.5 HYPERLIPIDEMIA, UNSPECIFIED: Chronic | Status: ACTIVE | Noted: 2025-02-07

## 2025-02-10 PROBLEM — I48.0 PAROXYSMAL ATRIAL FIBRILLATION: Chronic | Status: ACTIVE | Noted: 2025-02-07

## 2025-02-27 ENCOUNTER — APPOINTMENT (OUTPATIENT)
Dept: ELECTROPHYSIOLOGY | Facility: CLINIC | Age: 85
End: 2025-02-27
Payer: MEDICARE

## 2025-02-27 PROCEDURE — 99212 OFFICE O/P EST SF 10 MIN: CPT | Mod: 93

## 2025-03-14 ENCOUNTER — APPOINTMENT (OUTPATIENT)
Dept: ELECTROPHYSIOLOGY | Facility: CLINIC | Age: 85
End: 2025-03-14
Payer: MEDICARE

## 2025-03-14 ENCOUNTER — NON-APPOINTMENT (OUTPATIENT)
Age: 85
End: 2025-03-14

## 2025-03-14 PROCEDURE — 93298 REM INTERROG DEV EVAL SCRMS: CPT

## 2025-04-18 ENCOUNTER — NON-APPOINTMENT (OUTPATIENT)
Age: 85
End: 2025-04-18

## 2025-04-18 ENCOUNTER — APPOINTMENT (OUTPATIENT)
Dept: ELECTROPHYSIOLOGY | Facility: CLINIC | Age: 85
End: 2025-04-18
Payer: MEDICARE

## 2025-04-18 PROCEDURE — 93298 REM INTERROG DEV EVAL SCRMS: CPT

## 2025-05-19 ENCOUNTER — NON-APPOINTMENT (OUTPATIENT)
Age: 85
End: 2025-05-19

## 2025-05-19 ENCOUNTER — APPOINTMENT (OUTPATIENT)
Dept: ELECTROPHYSIOLOGY | Facility: CLINIC | Age: 85
End: 2025-05-19
Payer: MEDICARE

## 2025-05-19 PROCEDURE — 93298 REM INTERROG DEV EVAL SCRMS: CPT

## 2025-05-22 NOTE — PATIENT PROFILE ADULT. - TEACHING/LEARNING FACTORS IMPACT ABILITY TO LEARN
Called and spoke with patient, surgery date chosen 06/23/2025. Orders will be placed, all questions regarding surgery have been answered. Post op appointments will be mailed to patient.     
none

## 2025-06-20 ENCOUNTER — NON-APPOINTMENT (OUTPATIENT)
Age: 85
End: 2025-06-20

## 2025-06-20 ENCOUNTER — APPOINTMENT (OUTPATIENT)
Dept: ELECTROPHYSIOLOGY | Facility: CLINIC | Age: 85
End: 2025-06-20
Payer: MEDICARE

## 2025-06-20 PROCEDURE — 93298 REM INTERROG DEV EVAL SCRMS: CPT

## 2025-07-25 ENCOUNTER — APPOINTMENT (OUTPATIENT)
Dept: ELECTROPHYSIOLOGY | Facility: CLINIC | Age: 85
End: 2025-07-25
Payer: MEDICARE

## 2025-07-25 ENCOUNTER — NON-APPOINTMENT (OUTPATIENT)
Age: 85
End: 2025-07-25

## 2025-07-25 PROCEDURE — 93298 REM INTERROG DEV EVAL SCRMS: CPT

## 2025-08-28 ENCOUNTER — NON-APPOINTMENT (OUTPATIENT)
Age: 85
End: 2025-08-28

## 2025-08-28 ENCOUNTER — APPOINTMENT (OUTPATIENT)
Dept: ELECTROPHYSIOLOGY | Facility: CLINIC | Age: 85
End: 2025-08-28
Payer: MEDICARE

## 2025-08-28 PROCEDURE — 93298 REM INTERROG DEV EVAL SCRMS: CPT
